# Patient Record
Sex: FEMALE | Race: WHITE | NOT HISPANIC OR LATINO | Employment: PART TIME | URBAN - METROPOLITAN AREA
[De-identification: names, ages, dates, MRNs, and addresses within clinical notes are randomized per-mention and may not be internally consistent; named-entity substitution may affect disease eponyms.]

---

## 2017-02-07 ENCOUNTER — OFFICE VISIT (OUTPATIENT)
Dept: DERMATOLOGY | Facility: CLINIC | Age: 32
End: 2017-02-07

## 2017-02-07 VITALS — DIASTOLIC BLOOD PRESSURE: 66 MMHG | HEART RATE: 71 BPM | SYSTOLIC BLOOD PRESSURE: 120 MMHG

## 2017-02-07 DIAGNOSIS — L63.9 AA (ALOPECIA AREATA): Primary | ICD-10-CM

## 2017-02-07 DIAGNOSIS — L91.0 HYPERTROPHIC SCAR: ICD-10-CM

## 2017-02-07 DIAGNOSIS — L21.9 DERMATITIS, SEBORRHEIC: ICD-10-CM

## 2017-02-07 DIAGNOSIS — M35.9 AUTOIMMUNE DISEASE (H): ICD-10-CM

## 2017-02-07 ASSESSMENT — PAIN SCALES - GENERAL: PAINLEVEL: NO PAIN (0)

## 2017-02-07 NOTE — NURSING NOTE
"Dermatology Rooming Note    Shea Siddiqui's goals for this visit include:   Chief Complaint   Patient presents with     Derm Problem     Patient comes to clinic today for hairloss. States \"I have some new growth but I'm still losing a lot.\"     Tisha Ernst, [facility]    "

## 2017-02-07 NOTE — MR AVS SNAPSHOT
After Visit Summary   2/7/2017    Shea Siddiqui    MRN: [id number]           Patient Information     Date Of Birth          1985        Visit Information        Provider Department      2/7/2017 2:05 PM Valery Santoyo MD [facility] Dermatology        Care Instructions    Today:    1. We will perform repeat injections- total of 4 cc  2. We ask that you discuss a possible prednisone taper with your Gyn/fertility specialist before undertaking treatment (prednisone can suppress sex hormones, and we wouldn't want to mess up any testing!)  3. We will inject the same triamcinolone medication into the scar on the heel    No link between Clomid and alopecia areata that we know of.        Follow-ups after your visit        Who to contact     Please call your clinic at [phone] to:    Ask questions about your health    Make or cancel appointments    Discuss your medicines    Learn about your test results    Speak to your doctor   If you have compliments or concerns about an experience at your clinic, or if you wish to file a complaint, please contact [facility] Patient Relations at [phone] or email us at [email or web address]         Additional Information About Your Visit        MyChart Information     [employer] gives you secure access to your electronic health record. If you see a primary care provider, you can also send messages to your care team and make appointments. If you have questions, please call your primary care clinic.  If you do not have a primary care provider, please call [phone] and they will assist you.      [employer]let is an electronic gateway that provides easy, online access to your medical records. With [employer], you can request a clinic appointment, read your test results, renew a prescription or communicate with your care team.     To access your existing account, please contact your [facility] Clinic  or call [phone] for assistance.        Care EveryWhere ID     This is your Care EveryWhere ID. This could be used by other organizations to access your [facility] medical records  [id number]        Your Vitals Were     Pulse                   71            Blood Pressure from Last 3 Encounters:   02/07/17 120/66   12/12/16 127/75   06/27/16 122/72    Weight from Last 3 Encounters:   12/29/15 106.595 kg (235 lb)              Today, you had the following     No orders found for display       Primary Care Provider    None       No address on file        Thank you!     Thank you for choosing [facility] DERMATOLOGY  for your care. Our goal is always to provide you with excellent care. Hearing back from our patients is one way we can continue to improve our services. Please take a few minutes to complete the written survey that you may receive in the mail after your visit with us. Thank you!             Your Updated Medication List - Protect others around you: Learn how to safely use, store and throw away your medicines at www.disposemymeds.org.          This list is accurate as of: 2/7/17  3:02 PM.  Always use your most recent med list.                   Brand Name Dispense Instructions for use    ALBUTEROL IN      Inhale into the lungs as needed       clobetasol propionate 0.05 % Foam    OLUX    100 g    Apply a golf ball size of product to affected areas nightly 6 to 7 days of the week (one day off)       esomeprazole 20 MG CR capsule    nexIUM    60 capsule    Take 1 capsule (20 mg) by mouth every morning (before breakfast) Take 30-60 minutes before eating.       flurandrenolide 4 MCG/SQCM Tape    CORDRAN    1 each    Apply to affected area on left achilles daily until healed.       predniSONE 10 MG tablet    DELTASONE    112 tablet    Take 1 tablet (10 mg) by mouth every 48 hours

## 2017-02-07 NOTE — PROGRESS NOTES
[facility] Dermatology Note    Dermatology Problem List:  1. Alopecia areata with concomitant seb derm (improved 2/7/17)  -s/p 3 cc ILK 10mg/cc at 12/12/16 visit, 4 cc ILK 10mg/cc  -current treatment: clobetasol foam 0.05% nightly, and Head & Shoulders shampoo daily   2. Lichen simplex chronicus vs hypertrophic scar - left achilles  -Cordran tape too expensive, ILK-10 0.1cc 2/7/17    Encounter Date: Feb 7, 2017    CC:  No chief complaint on file.    History of Present Illness:  Ms. Shea Siddiqui is a 31 year old female who presents as a follow-up for alopecia areata. The patient was last seen 12/12/16 when a total of  3 total cc of Kenalog 10 mg/cc was injected into approximately 30 sites on the scalp and it was recommended she continue clobetasol 0.05% foam nightly and start Head and Shoulders shampoo.    Today, she notes some regrowth on the left side of the scalp, but has additionally noticed more loss of hair diffusely, perhaps more on the right side. She is using clobetasol 0.05% foam a couple times per week.  She is using the H&S Shampoo every time she shampoos. She washes her hair after with Biolage shampoo and conditioner. She is hoping for repeat injections today as she is hoping to get pregnant soon at which time she would stop injections.     She denies pruritus, burning or other scalp pain. She did have L knee surgery December 29th and it went smoothly.Denies any changes in medication or significant health changes since her last visit.     She did not purchase the Cordran tape due to it being too expensive.     Past Medical History:   Patient Active Problem List   Diagnosis     Alopecia areata     Autoimmune disease (H)     KP (keratosis pilaris)     Dermatitis, seborrheic     Skin atrophy     No past medical history on file.  No past surgical history on file.    Social History:  The patient works as a .  Patient and her  have 2 children, and are trying to get  pregnant with a third child.      Medications:  Current Outpatient Prescriptions   Medication Sig Dispense Refill     flurandrenolide (CORDRAN) 4 MCG/SQCM TAPE Apply to affected area on left achilles daily until healed. 1 each 3     clobetasol propionate (OLUX) 0.05 % FOAM Apply a golf ball size of product to affected areas nightly 6 to 7 days of the week (one day off) 100 g 3     predniSONE (DELTASONE) 10 MG tablet Take 1 tablet (10 mg) by mouth every 48 hours 112 tablet 0     esomeprazole (NEXIUM) 20 MG capsule Take 1 capsule (20 mg) by mouth every morning (before breakfast) Take 30-60 minutes before eating. 60 capsule 3     ALBUTEROL IN Inhale into the lungs as needed       Allergies   Allergen Reactions     Ceclor [Cefaclor]      hives     Erythromycin      hives     Relafen [Nabumetone]      hives     Rocephin [Ceftriaxone]      hives     Review of Systems:  - A six point ROS was negative except as noted in HPI.     Physical exam:  GEN: This is a well-developed, well-nourished female in no acute distress, in a pleasant mood.   SKIN: Focused examination of the face, scalp, fingernails, and left lower extremity was performed and notable for:  -Marie skin type II  -scalp free of erythema or scale  -large patches of alopecia present in ophiasis pattern with patches of regrowth at previous injection sites and scattered areas of mild scalp atrophy   -multiple patches of alopecia on frontal/temporal scalp with mild atrophy present (wil over L frontal scalp)  -mild nail pitting present on bilateral fingernails  -no patches of hair loss present on eyebrows or eyelashes  -left achilles with scaly and sclerotic pink linear plaque with central vertical shallow erosion with hemorrhagic crust.   -No other lesions of concern on areas examined.   -Negative hair pull tests    Impression/Plan:  1. Alopecia areata, previously with concomitant seborrheic dermatitis (improved 2/7/17) and mild skin atrophy related to ILK  injections: Worsening alopecia areata since having IUD removed in August. Skin atrophy also noted which is most likely related to previous ILK injections.    Kenalog intralesional injection procedure note: After verbal consent and discussion of risks including but not limited to atrophy, pain, and bruising, 3.9 total cc of Kenalog 10 mg/cc was injected into approximately 39 sites on the scalp.  The patient tolerated the procedure well and left the Dermatology clinic in good condition. Sites of skin atrophy were avoided.    Continue clobetasol 0.05% foam 3x per week    Continue Head and Shoulders shampoo daily to prevent seb derm    Photographs taken for future reference    Discussed that prednisone taper would impact sex hormone balance and may confound the fertility discussion with her OB/Gyn ( Glucocorticoids affect gonadal function at multiple levels in hypothalamo-pituitary-gonadal axis: 1) the hypothalamus (to decrease the synthesis and release of GnRH); 2) the pituitary gland (to inhibit the synthesis and release of LH and FSH); 3) the testis/ovary (to modulate steroidogenesis and/or gametogenesis directly).    2.   Lichen simplex chronicus vs hypertrophic scar - left achilles. Previous traumatic injury. Difficult area to heal given rubbing on boots.    Cordran tape too expensive    Kenalog intralesional injection procedure note: After verbal consent and discussion of risks including but not limited to atrophy, pain, and bruising, 0.1 total cc of Kenalog 10 mg/cc was injected into L Achilles scar.  The patient tolerated the procedure well and left the Dermatology clinic in good condition.     Follow-up in 2 months, earlier for new or changing lesions.     Staff Involved:  Patient was seen and discussed with Dr. Santoyo.    Lisa Khalil MD  PGY-3 Dermatology  Pager: [phone]      Patient was seen and examined with the dermatology resident. I agree with the history, review of systems, physical  examination, assessments and plan. ILK injections were completed together.     Valery Santoyo MD  Professor and  Chair  Department of Dermatology  [facility]

## 2017-02-07 NOTE — PATIENT INSTRUCTIONS
Today:    1. We will perform repeat injections- total of 4 cc  2. We ask that you discuss a possible prednisone taper with your Gyn/fertility specialist before undertaking treatment (prednisone can suppress sex hormones, and we wouldn't want to mess up any testing!)  3. We will inject the same triamcinolone medication into the scar on the heel    No link between Clomid and alopecia areata that we know of.

## 2017-02-07 NOTE — LETTER
2/7/2017     RE: Shea Siddiqui  [address] [place] [address]     Dear Colleague,    Thank you for referring your patient, Shea Siddiqui, to the [facility] DERMATOLOGY at [facility]. Please see a copy of my visit note below.    [facility] Dermatology Note    Dermatology Problem List:  1. Alopecia areata with concomitant seb derm (improved 2/7/17)  -s/p 3 cc ILK 10mg/cc at 12/12/16 visit, 4 cc ILK 10mg/cc  -current treatment: clobetasol foam 0.05% nightly, and Head & Shoulders shampoo daily   2. Lichen simplex chronicus vs hypertrophic scar - left achilles  -Cordran tape too expensive, ILK-10 0.1cc 2/7/17    Encounter Date: Feb 7, 2017    CC:  No chief complaint on file.    History of Present Illness:  Ms. Shea Siddiqui is a 31 year old female who presents as a follow-up for alopecia areata. The patient was last seen 12/12/16 when a total of  3 total cc of Kenalog 10 mg/cc was injected into approximately 30 sites on the scalp and it was recommended she continue clobetasol 0.05% foam nightly and start Head and Shoulders shampoo.    Today, she notes some regrowth on the left side of the scalp, but has additionally noticed more loss of hair diffusely, perhaps more on the right side. She is using clobetasol 0.05% foam a couple times per week.  She is using the H&S Shampoo every time she shampoos. She washes her hair after with Biolage shampoo and conditioner. She is hoping for repeat injections today as she is hoping to get pregnant soon at which time she would stop injections.     She denies pruritus, burning or other scalp pain. She did have L knee surgery December 29th and it went smoothly.Denies any changes in medication or significant health changes since her last visit.     She did not purchase the Cordran tape due to it being too expensive.     Past Medical History:   Patient Active Problem List   Diagnosis     Alopecia areata     Autoimmune  disease (H)     KP (keratosis pilaris)     Dermatitis, seborrheic     Skin atrophy     No past medical history on file.  No past surgical history on file.    Social History:  The patient works as a .  Patient and her  have 2 children, and are trying to get pregnant with a third child.      Medications:  Current Outpatient Prescriptions   Medication Sig Dispense Refill     flurandrenolide (CORDRAN) 4 MCG/SQCM TAPE Apply to affected area on left achilles daily until healed. 1 each 3     clobetasol propionate (OLUX) 0.05 % FOAM Apply a golf ball size of product to affected areas nightly 6 to 7 days of the week (one day off) 100 g 3     predniSONE (DELTASONE) 10 MG tablet Take 1 tablet (10 mg) by mouth every 48 hours 112 tablet 0     esomeprazole (NEXIUM) 20 MG capsule Take 1 capsule (20 mg) by mouth every morning (before breakfast) Take 30-60 minutes before eating. 60 capsule 3     ALBUTEROL IN Inhale into the lungs as needed       Allergies   Allergen Reactions     Ceclor [Cefaclor]      hives     Erythromycin      hives     Relafen [Nabumetone]      hives     Rocephin [Ceftriaxone]      hives     Review of Systems:  - A six point ROS was negative except as noted in HPI.     Physical exam:  GEN: This is a well-developed, well-nourished female in no acute distress, in a pleasant mood.   SKIN: Focused examination of the face, scalp, fingernails, and left lower extremity was performed and notable for:  -Marie skin type II  -scalp free of erythema or scale  -large patches of alopecia present in ophiasis pattern with patches of regrowth at previous injection sites and scattered areas of mild scalp atrophy   -multiple patches of alopecia on frontal/temporal scalp with mild atrophy present (wil over L frontal scalp)  -mild nail pitting present on bilateral fingernails  -no patches of hair loss present on eyebrows or eyelashes  -left achilles with scaly and sclerotic pink linear plaque with central  vertical shallow erosion with hemorrhagic crust.   -No other lesions of concern on areas examined.   -Negative hair pull tests    Impression/Plan:  1. Alopecia areata, previously with concomitant seborrheic dermatitis (improved 2/7/17) and mild skin atrophy related to ILK injections: Worsening alopecia areata since having IUD removed in August. Skin atrophy also noted which is most likely related to previous ILK injections.    Kenalog intralesional injection procedure note: After verbal consent and discussion of risks including but not limited to atrophy, pain, and bruising, 3.9 total cc of Kenalog 10 mg/cc was injected into approximately 39 sites on the scalp.  The patient tolerated the procedure well and left the Dermatology clinic in good condition. Sites of skin atrophy were avoided.    Continue clobetasol 0.05% foam 3x per week    Continue Head and Shoulders shampoo daily to prevent seb derm    Photographs taken for future reference    Discussed that prednisone taper would impact sex hormone balance and may confound the fertility discussion with her OB/Gyn ( Glucocorticoids affect gonadal function at multiple levels in hypothalamo-pituitary-gonadal axis: 1) the hypothalamus (to decrease the synthesis and release of GnRH); 2) the pituitary gland (to inhibit the synthesis and release of LH and FSH); 3) the testis/ovary (to modulate steroidogenesis and/or gametogenesis directly).    2.   Lichen simplex chronicus vs hypertrophic scar - left achilles. Previous traumatic injury. Difficult area to heal given rubbing on boots.    Cordran tape too expensive    Kenalog intralesional injection procedure note: After verbal consent and discussion of risks including but not limited to atrophy, pain, and bruising, 0.1 total cc of Kenalog 10 mg/cc was injected into L Achilles scar.  The patient tolerated the procedure well and left the Dermatology clinic in good condition.     Follow-up in 2 months, earlier for new or changing  lesions.     Staff Involved:  Patient was seen and discussed with Dr. Santoyo.    Lisa Khalil MD  PGY-3 Dermatology  Pager: [phone]      Patient was seen and examined with the dermatology resident. I agree with the history, review of systems, physical examination, assessments and plan. ILK injections were completed together.     Valery Santoyo MD  Professor and  Chair  Department of Dermatology  [facility]                        Pictures taken of patient today to be placed in chart for future reference.    Again, thank you for allowing me to participate in the care of your patient.      Sincerely,    Valery Santoyo MD

## 2017-06-12 ENCOUNTER — OFFICE VISIT (OUTPATIENT)
Dept: DERMATOLOGY | Facility: CLINIC | Age: 32
End: 2017-06-12

## 2017-06-12 DIAGNOSIS — M35.9 AUTOIMMUNE DISEASE (H): ICD-10-CM

## 2017-06-12 DIAGNOSIS — L63.9 ALOPECIA AREATA: Primary | ICD-10-CM

## 2017-06-12 DIAGNOSIS — L21.9 DERMATITIS, SEBORRHEIC: ICD-10-CM

## 2017-06-12 RX ORDER — PROMETHAZINE HYDROCHLORIDE 25 MG/1
TABLET ORAL EVERY 6 HOURS PRN
COMMUNITY

## 2017-06-12 ASSESSMENT — PAIN SCALES - GENERAL: PAINLEVEL: NO PAIN (0)

## 2017-06-12 NOTE — NURSING NOTE
Drug Administration Record    Drug Name: triamcinolone acetonide(kenalog)  Dose: 1mL of triamcinolone 10mg/mL, 10mg dose  Route administered: ID  NDC #: Kenalog-10 ([address]-72)  Amount of waste(mL):4  Reason for waste: Single use vial

## 2017-06-12 NOTE — PROGRESS NOTES
"[facility] Dermatology Note      Dermatology Problem List:  1.Alopecia areata   -Patient is 16 weeks pregnant  -1 cc of 10/mg kenalog injection today     2. Lichen simplex chronicus vs hypertrophic scar - left achilles. - Not addressed at this visit.      Encounter Date: Jun 12, 2017    CC:  Chief Complaint   Patient presents with     Derm Problem     Shea is here today for an alopecia areata follow up.  States that it's \"worse\" since her last visit.  States she is also \"16 weeks pregnant.\"           History of Present Illness:  Ms. Shea Siddiqui is a 32 year old female who is 16 weeks pregnant at the end of this week. and presents today for follow-up alopecia areata. She was last seen 2/7/2017 at which time she had 3.9 total cc of kenalog 10 mg/cc injections which she tolerated well.    Since last visit feels hair loss is worse, however she feels this is due to her pregnancy. Previously used clobetasol 0.05% foam 3x a week. She currently is not using any medication on her scalp due to the pregnancy. Using head and shoulders shampoo and Biolage shampoo. Hair loss predominantly on temporal area and noticing more on her part. No burning, pain, erythema, pruritis. Sometimes styles her hair.     Currently taking prenatal supplement which has iron in it. Thyroid labs were normal. Has not had a history of gestational diabetes. She states her Obstetrician is okay with her receiving IL  kenalog injections today.     Past Medical History:   Patient Active Problem List   Diagnosis     Alopecia areata     Autoimmune disease (H)     KP (keratosis pilaris)     Dermatitis, seborrheic     Skin atrophy     History reviewed. No pertinent past medical history.  History reviewed. No pertinent surgical history.    Social History:  The patient works as a .    Medications:  Current Outpatient Prescriptions   Medication Sig Dispense Refill     Ondansetron HCl (ZOFRAN PO) Take by mouth as needed for " nausea or vomiting       promethazine (PHENERGAN) 25 MG tablet Take by mouth every 6 hours as needed for nausea       ranitidine (ZANTAC) 150 MG capsule Take by mouth 2 times daily       Prenatal MV-Min-Fe Fum-FA-DHA (PRENATAL 1 PO) Take by mouth daily       ALBUTEROL IN Inhale into the lungs as needed       clobetasol propionate (OLUX) 0.05 % FOAM Apply a golf ball size of product to affected areas nightly 6 to 7 days of the week (one day off) (Patient not taking: Reported on 6/12/2017) 100 g 3     predniSONE (DELTASONE) 10 MG tablet Take 1 tablet (10 mg) by mouth every 48 hours (Patient not taking: Reported on 6/12/2017) 112 tablet 0     esomeprazole (NEXIUM) 20 MG capsule Take 1 capsule (20 mg) by mouth every morning (before breakfast) Take 30-60 minutes before eating. (Patient not taking: Reported on 6/12/2017) 60 capsule 3     Allergies   Allergen Reactions     Ceclor [Cefaclor]      hives     Erythromycin      hives     Relafen [Nabumetone]      hives     Rocephin [Ceftriaxone]      hives         Review of Systems:  -Constitutional: The patient denies fatigue, fevers, chills, unintended weight loss, and night sweats.   -First trimester had a lot of nausea and vomiting   -Skin: As above in HPI. No additional skin concerns.    Physical exam:  Vitals: /71 (BP Location: Left arm, Patient Position: Chair, Cuff Size: Adult Regular)  Pulse 64  GEN: This is a well developed, well-nourished female in no acute distress, in a pleasant mood.    SKIN: Focused examination of the scalp was performed.  -Negative hair pull test today; 1 hair fiber on posterior frontal scalp on hair pull test   -No other lesions of concern on areas examined.   - Focal patches of non-scarring alopecia appreciated on the scalp - see photodocumentation of these areas  -Eyebrows and eyelashes appear intact  - Minimal fingernail pitting  - Did not examine the lefts achilles heel area - will address at next visit  - No other areas of concern  on areas examined      Impression/Plan:      Alopecia areata, previously with concomitant seborrheic dermatitis (improved 2/7/17) and mild skin atrophy related to ILK injections: Worsening alopecia areata since start of recent pregnancy. OB has approved ILK injections at this visit.     Kenalog intralesional injection procedure note: After verbal consent and discussion of risks including but not limited to atrophy, pain, and bruising, 1.0 total cc of Kenalog 10 mg/cc was injected into approximately 10 sites on the scalp.  The patient tolerated the procedure well and left the Dermatology clinic in good condition. Sites of skin atrophy were avoided.    Continue Head and Shoulders shampoo daily to prevent seb derm    Photographs taken for future reference       2.                    Lichen simplex chronicus vs hypertrophic scar - left achilles. Not addressed at this visit.     CC Dr. Byrnes at [facility] in [place] on close of this encounter. Phone: [phone]  Follow-up 6-8 weeks as needed during pregancy.    Staff Involved:  Scribed by Dorothy Khanna MS3  for Dr. Valery Santoyo MD    I agree with the PFSH and ROS as completed by the Medical Student. The remainder of the encounter was performed by me and scribed by the Medical Student. The scribed note accurately reflects my personal services and the medical decisions made by me. ILK injections were done together.       Valery Santoyo MD  Professor and Chair  Department of Dermatology  [facility]

## 2017-06-12 NOTE — NURSING NOTE
"Dermatology Rooming Note    Shea Artur's goals for this visit include:   Chief Complaint   Patient presents with     Derm Problem     Shea is here today for an alopecia areata follow up.  States that it's \"worse\" since her last visit.  States she is also \"16 weeks pregnant.\"         Danielle Schwartz LPN  "

## 2017-06-12 NOTE — LETTER
"6/12/2017        RE: Shea Siddiqui  [address] [place] [address]     Dear Colleague,    Thank you for referring your patient, Shea Siddiqui, to the [facility] DERMATOLOGY at [facility]. Please see a copy of my visit note below.    [facility] Dermatology Note      Dermatology Problem List:  1.Alopecia areata   -Patient is 16 weeks pregnant  -1 cc of 10/mg kenalog injection today     2. Lichen simplex chronicus vs hypertrophic scar - left achilles. - Not addressed at this visit.      Encounter Date: Jun 12, 2017    CC:  Chief Complaint   Patient presents with     Derm Problem     Shea is here today for an alopecia areata follow up.  States that it's \"worse\" since her last visit.  States she is also \"16 weeks pregnant.\"           History of Present Illness:  Ms. Shea Siddiqui is a 32 year old female who is 16 weeks pregnant at the end of this week. and presents today for follow-up alopecia areata. She was last seen 2/7/2017 at which time she had 3.9 total cc of kenalog 10 mg/cc injections which she tolerated well.    Since last visit feels hair loss is worse, however she feels this is due to her pregnancy. Previously used clobetasol 0.05% foam 3x a week. She currently is not using any medication on her scalp due to the pregnancy. Using head and shoulders shampoo and Biolage shampoo. Hair loss predominantly on temporal area and noticing more on her part. No burning, pain, erythema, pruritis. Sometimes styles her hair.     Currently taking prenatal supplement which has iron in it. Thyroid labs were normal. Has not had a history of gestational diabetes. She states her Obstetrician is okay with her receiving [place]  kenalog injections today.     Past Medical History:   Patient Active Problem List   Diagnosis     Alopecia areata     Autoimmune disease (H)     KP (keratosis pilaris)     Dermatitis, seborrheic     Skin atrophy     History reviewed. No " pertinent past medical history.  History reviewed. No pertinent surgical history.    Social History:  The patient works as a .    Medications:  Current Outpatient Prescriptions   Medication Sig Dispense Refill     Ondansetron HCl (ZOFRAN PO) Take by mouth as needed for nausea or vomiting       promethazine (PHENERGAN) 25 MG tablet Take by mouth every 6 hours as needed for nausea       ranitidine (ZANTAC) 150 MG capsule Take by mouth 2 times daily       Prenatal MV-Min-Fe Fum-FA-DHA (PRENATAL 1 PO) Take by mouth daily       ALBUTEROL IN Inhale into the lungs as needed       clobetasol propionate (OLUX) 0.05 % FOAM Apply a golf ball size of product to affected areas nightly 6 to 7 days of the week (one day off) (Patient not taking: Reported on 6/12/2017) 100 g 3     predniSONE (DELTASONE) 10 MG tablet Take 1 tablet (10 mg) by mouth every 48 hours (Patient not taking: Reported on 6/12/2017) 112 tablet 0     esomeprazole (NEXIUM) 20 MG capsule Take 1 capsule (20 mg) by mouth every morning (before breakfast) Take 30-60 minutes before eating. (Patient not taking: Reported on 6/12/2017) 60 capsule 3     Allergies   Allergen Reactions     Ceclor [Cefaclor]      hives     Erythromycin      hives     Relafen [Nabumetone]      hives     Rocephin [Ceftriaxone]      hives         Review of Systems:  -Constitutional: The patient denies fatigue, fevers, chills, unintended weight loss, and night sweats.   -First trimester had a lot of nausea and vomiting   -Skin: As above in HPI. No additional skin concerns.    Physical exam:  Vitals: /71 (BP Location: Left arm, Patient Position: Chair, Cuff Size: Adult Regular)  Pulse 64  GEN: This is a well developed, well-nourished female in no acute distress, in a pleasant mood.    SKIN: Focused examination of the scalp was performed.  -Negative hair pull test today; 1 hair fiber on posterior frontal scalp on hair pull test   -No other lesions of concern on areas examined.    - Focal patches of non-scarring alopecia appreciated on the scalp - see photodocumentation of these areas  -Eyebrows and eyelashes appear intact  - Minimal fingernail pitting  - Did not examine the lefts achilles heel area - will address at next visit  - No other areas of concern on areas examined      Impression/Plan:      Alopecia areata, previously with concomitant seborrheic dermatitis (improved 2/7/17) and mild skin atrophy related to ILK injections: Worsening alopecia areata since start of recent pregnancy. OB has approved ILK injections at this visit.     Kenalog intralesional injection procedure note: After verbal consent and discussion of risks including but not limited to atrophy, pain, and bruising, 1.0 total cc of Kenalog 10 mg/cc was injected into approximately 10 sites on the scalp.  The patient tolerated the procedure well and left the Dermatology clinic in good condition. Sites of skin atrophy were avoided.    Continue Head and Shoulders shampoo daily to prevent seb derm    Photographs taken for future reference       2.                    Lichen simplex chronicus vs hypertrophic scar - left achilles. Not addressed at this visit.     CC Dr. Byrnes at [facility] in [place] on close of this encounter. Phone: [phone]  Follow-up 6-8 weeks as needed during pregancy.    Staff Involved:  Scribed by Dorothy Khanna MS3  for Dr. Valery Santoyo MD    I agree with the PFSH and ROS as completed by the Medical Student. The remainder of the encounter was performed by me and scribed by the Medical Student. The scribed note accurately reflects my personal services and the medical decisions made by me. ILK injections were done together.       Valery Santoyo MD  Professor and Chair  Department of Dermatology  [facility]                            Pictures were placed in Pt's chart today for future reference.          Again, thank you for allowing me to participate in the care of your  patient.      Sincerely,    Valery Santoyo MD

## 2017-06-12 NOTE — MR AVS SNAPSHOT
After Visit Summary   6/12/2017    Shea Siddiqui    MRN: [id number]           Patient Information     Date Of Birth          1985        Visit Information        Provider Department      6/12/2017 9:00 AM Valery Santoyo MD [facility] Dermatology         Follow-ups after your visit        Your next 10 appointments already scheduled     Sep 18, 2017 10:15 AM CDT   (Arrive by 10:00 AM)   RETURN HAIRLOSS with Valery Santoyo MD   [facility] Dermatology ([facility] [place])    [address] [place] [id number] [phone]              Who to contact     Please call your clinic at [phone] to:    Ask questions about your health    Make or cancel appointments    Discuss your medicines    Learn about your test results    Speak to your doctor   If you have compliments or concerns about an experience at your clinic, or if you wish to file a complaint, please contact [facility] Physicians Patient Relations at [phone] or email us at [email or web address]         Additional Information About Your Visit        MyChart Information     [employer] gives you secure access to your electronic health record. If you see a primary care provider, you can also send messages to your care team and make appointments. If you have questions, please call your primary care clinic.  If you do not have a primary care provider, please call [phone] and they will assist you.      [employer] is an electronic gateway that provides easy, online access to your medical records. With [employer], you can request a clinic appointment, read your test results, renew a prescription or communicate with your care team.     To access your existing account, please contact your [facility] Phys[facility] Clinic or call [phone] for assistance.        Care EveryWhere ID     This is your Care EveryWhere ID. This could be used by other  organizations to access your [place] medical records  [id number]        Your Vitals Were     Pulse                   64            Blood Pressure from Last 3 Encounters:   06/12/17 112/71   02/07/17 120/66   12/12/16 127/75    Weight from Last 3 Encounters:   12/29/15 106.6 kg (235 lb)              Today, you had the following     No orders found for display         Today's Medication Changes          These changes are accurate as of: 6/12/17  9:33 AM.  If you have any questions, ask your nurse or doctor.               Stop taking these medicines if you haven't already. Please contact your care team if you have questions.     flurandrenolide 4 MCG/SQCM Tape   Commonly known as:  CORDRAN   Stopped by:  Valery Santoyo MD                    Primary Care Provider    None       No address on file        Thank you!     Thank you for choosing [facility] DERMATOLOGY  for your care. Our goal is always to provide you with excellent care. Hearing back from our patients is one way we can continue to improve our services. Please take a few minutes to complete the written survey that you may receive in the mail after your visit with us. Thank you!             Your Updated Medication List - Protect others around you: Learn how to safely use, store and throw away your medicines at www.disposemymeds.org.          This list is accurate as of: 6/12/17  9:33 AM.  Always use your most recent med list.                   Brand Name Dispense Instructions for use    ALBUTEROL IN      Inhale into the lungs as needed       clobetasol propionate 0.05 % Foam    OLUX    100 g    Apply a golf ball size of product to affected areas nightly 6 to 7 days of the week (one day off)       esomeprazole 20 MG CR capsule    nexIUM    60 capsule    Take 1 capsule (20 mg) by mouth every morning (before breakfast) Take 30-60 minutes before eating.       predniSONE 10 MG tablet    DELTASONE    112 tablet    Take 1 tablet (10 mg) by mouth every  48 hours       PRENATAL 1 PO      Take by mouth daily       promethazine 25 MG tablet    PHENERGAN     Take by mouth every 6 hours as needed for nausea       ranitidine 150 MG capsule    ZANTAC     Take by mouth 2 times daily       ZOFRAN PO      Take by mouth as needed for nausea or vomiting

## 2017-06-14 VITALS — HEART RATE: 64 BPM | SYSTOLIC BLOOD PRESSURE: 112 MMHG | DIASTOLIC BLOOD PRESSURE: 71 MMHG

## 2017-06-14 ASSESSMENT — PAIN SCALES - GENERAL: PAINLEVEL: NO PAIN (0)

## 2017-09-18 ENCOUNTER — OFFICE VISIT (OUTPATIENT)
Dept: DERMATOLOGY | Facility: CLINIC | Age: 32
End: 2017-09-18

## 2017-09-18 VITALS — DIASTOLIC BLOOD PRESSURE: 62 MMHG | SYSTOLIC BLOOD PRESSURE: 116 MMHG | HEART RATE: 76 BPM

## 2017-09-18 DIAGNOSIS — L63.9 ALOPECIA AREATA: ICD-10-CM

## 2017-09-18 DIAGNOSIS — M35.9 AUTOIMMUNE DISEASE (H): ICD-10-CM

## 2017-09-18 DIAGNOSIS — L23.0 ALLERGIC CONTACT DERMATITIS DUE TO METALS: Primary | ICD-10-CM

## 2017-09-18 RX ORDER — TRIAMCINOLONE ACETONIDE 1 MG/G
OINTMENT TOPICAL
Qty: 30 G | Refills: 1 | Status: SHIPPED | OUTPATIENT
Start: 2017-09-18

## 2017-09-18 ASSESSMENT — PAIN SCALES - GENERAL: PAINLEVEL: NO PAIN (0)

## 2017-09-18 NOTE — PATIENT INSTRUCTIONS
For rash on the finger, try wearing the ring on the right hand to see if you develop a similar reaction.  For treatment of the rash, apply triamcinolone 0.1% ointment twice per day until resolved.   You may also consider patch testing in the future to identify a specific allergen.

## 2017-09-18 NOTE — MR AVS SNAPSHOT
After Visit Summary   9/18/2017    Shea Siddiqui    MRN: [id number]           Patient Information     Date Of Birth          1985        Visit Information        Provider Department      9/18/2017 10:15 AM Valery Santoyo MD [facility] Dermatology        Today's Diagnoses     Allergic contact dermatitis due to metals    -  1      Care Instructions    For rash on the finger, try wearing the ring on the right hand to see if you develop a similar reaction.  For treatment of the rash, apply triamcinolone 0.1% ointment twice per day until resolved.   You may also consider patch testing in the future to identify a specific allergen.          Follow-ups after your visit        Follow-up notes from your care team     Return in about 3 months (around 12/18/2017) for Routine Visit.      Your next 10 appointments already scheduled     Dec 18, 2017  2:40 PM CST   (Arrive by 2:25 PM)   RETURN HAIRLOSS with Valery Santoyo MD   [facility] Dermatology ([facility] [place])    [address] [place] [address] [phone]              Who to contact     Please call your clinic at [phone] to:    Ask questions about your health    Make or cancel appointments    Discuss your medicines    Learn about your test results    Speak to your doctor   If you have compliments or concerns about an experience at your clinic, or if you wish to file a complaint, please contact [facility] Physicians Patient Relations at [phone] or email us at [email or web address]         Additional Information About Your Visit        MyChart Information     [employer] gives you secure access to your electronic health record. If you see a primary care provider, you can also send messages to your care team and make appointments. If you have questions, please call your primary care clinic.  If you do not have a primary care provider, please call  [phone] and they will assist you.      [employer] is an electronic gateway that provides easy, online access to your medical records. With [employer], you can request a clinic appointment, read your test results, renew a prescription or communicate with your care team.     To access your existing account, please contact your [facility] Phys[facility] Clinic or call [phone] for assistance.        Care EveryWhere ID     This is your Care EveryWhere ID. This could be used by other organizations to access your [place] medical records  [id number]        Your Vitals Were     Pulse                   76            Blood Pressure from Last 3 Encounters:   09/18/17 116/62   06/12/17 112/71   02/07/17 120/66    Weight from Last 3 Encounters:   12/29/15 106.6 kg (235 lb)              Today, you had the following     No orders found for display         Today's Medication Changes          These changes are accurate as of: 9/18/17 11:37 AM.  If you have any questions, ask your nurse or doctor.               Start taking these medicines.        Dose/Directions    triamcinolone 0.1 % ointment   Commonly known as:  KENALOG   Used for:  Allergic contact dermatitis due to metals   Started by:  Valery Santoyo MD        Apply to rash on the left ring finger twice per day until resolution   Quantity:  30 g   Refills:  1         Stop taking these medicines if you haven't already. Please contact your care team if you have questions.     esomeprazole 20 MG CR capsule   Commonly known as:  nexIUM   Stopped by:  Valery Santoyo MD                Where to get your medicines      These medications were sent to [id number] - [place] - [address] AT [address] & [address]  [address] [address]    Hours:  24-hours Phone:  [phone]     triamcinolone 0.1 % ointment                Primary Care Provider    None       No address on file         Equal Access to Services     [facility] CenterPATRICIA : Hadii aad ku hadsuzannearnol Villafana, waquynhda luqbarbraha, qashannan kaarlinedanuta ham. So [facility] [phone].    ATENCIÓN: Si salenala eric, tiene a guzman disposición servicios gratuitos de asistencia lingüística. Melviname al [phone].    We comply with applicable federal civil rights laws and [place] laws. We do not discriminate on the basis of race, color, national origin, age, disability sex, sexual orientation or gender identity.            Thank you!     Thank you for choosing [facility] DERMATOLOGY  for your care. Our goal is always to provide you with excellent care. Hearing back from our patients is one way we can continue to improve our services. Please take a few minutes to complete the written survey that you may receive in the mail after your visit with us. Thank you!             Your Updated Medication List - Protect others around you: Learn how to safely use, store and throw away your medicines at www.disposemymeds.org.          This list is accurate as of: 9/18/17 11:37 AM.  Always use your most recent med list.                   Brand Name Dispense Instructions for use Diagnosis    ALBUTEROL IN      Inhale into the lungs as needed        clobetasol propionate 0.05 % Foam    OLUX    100 g    Apply a golf ball size of product to affected areas nightly 6 to 7 days of the week (one day off)    Alopecia areata, Autoimmune disease (H)       predniSONE 10 MG tablet    DELTASONE    112 tablet    Take 1 tablet (10 mg) by mouth every 48 hours    Alopecia areata       PRENATAL 1 PO      Take by mouth daily        promethazine 25 MG tablet    PHENERGAN     Take by mouth every 6 hours as needed for nausea        ranitidine 150 MG capsule    ZANTAC     Take by mouth 2 times daily        triamcinolone 0.1 % ointment    KENALOG    30 g    Apply to rash on the left ring finger twice per day until resolution    Allergic contact dermatitis due  to metals       ZOFRAN PO      Take by mouth as needed for nausea or vomiting

## 2017-09-18 NOTE — PROGRESS NOTES
"[facility] Dermatology Note      Dermatology Problem List:  1. Alopecia areata:    -Patient is 30 weeks pregnant (as of 9/18/17)  -Has responded to ILK, last administered 6/2017     2. Lichen simplex chronicus vs hypertrophic scar - left achilles    3. Contact dermatitis, L ring finger: Suspect ACD 2/2 metal      Encounter Date: Sep 18, 2017    CC:  Chief Complaint   Patient presents with     Hair Loss     Shea comes to clinic today for Alopecia areata. States \"it's the same.\"         History of Present Illness:  Ms. Shea Siddiqui is a 32 year old female who is 30 weeks pregnant at the end of this week. and presents today for follow-up alopecia areata. She was last seen 6/12/2017 at which time she underwent ILK. Since then, she feels like she has had some regrowth in the treated areas though still substantial hair loss. The patient has not had any new areas of hair loss and no symptom associated with hair loss such as itching, tingling, burning, pain, etc. She is using primarily a Biolage shampoo, and periodically Head & Shoulders though she feels this makes her scalp more dry. The patient reports one other concern of an itchy rash on her left ring finger. This occurs in the area of her wedding ring and has been waxing/waning. The patient has used a topical steroid from a friend for 1-2 days in the past which helped significantly. She is otherwise feeling well with no additional skin concerns at this time.    Past Medical History:   Patient Active Problem List   Diagnosis     Alopecia areata     Autoimmune disease (H)     KP (keratosis pilaris)     Dermatitis, seborrheic     Skin atrophy     History reviewed. No pertinent past medical history.  History reviewed. No pertinent surgical history.    Social History:  The patient works as a .    Medications:  Current Outpatient Prescriptions   Medication Sig Dispense Refill     Ondansetron HCl (ZOFRAN PO) Take by mouth as needed for " nausea or vomiting       promethazine (PHENERGAN) 25 MG tablet Take by mouth every 6 hours as needed for nausea       ranitidine (ZANTAC) 150 MG capsule Take by mouth 2 times daily       Prenatal MV-Min-Fe Fum-FA-DHA (PRENATAL 1 PO) Take by mouth daily       clobetasol propionate (OLUX) 0.05 % FOAM Apply a golf ball size of product to affected areas nightly 6 to 7 days of the week (one day off) 100 g 3     predniSONE (DELTASONE) 10 MG tablet Take 1 tablet (10 mg) by mouth every 48 hours 112 tablet 0     ALBUTEROL IN Inhale into the lungs as needed       Allergies   Allergen Reactions     Ceclor [Cefaclor]      hives     Erythromycin      hives     Relafen [Nabumetone]      hives     Rocephin [Ceftriaxone]      hives         Review of Systems:  -Constitutional: The patient denies fatigue, fevers, chills, unintended weight loss, and night sweats.   -First trimester had a lot of nausea and vomiting   -Skin: As above in HPI. No additional skin concerns.    Physical exam:  Vitals: /62 (BP Location: Right arm, Patient Position: Sitting, Cuff Size: Adult Regular)  Pulse 76  GEN: This is a well developed, well-nourished female in no acute distress, in a pleasant mood.    SKIN: Focused examination of the scalp was performed and bilateral hands:  - Scalp with large patches of hair loss primarily on the bilateral parietal/temporal scalp, but also occipital scalp. Compared to prior, there is significant regrowth. Follicular ostia are noted in alopecic patches. Significant improvement compared to prior particularly with regrowth along the occipital scalp and frontal hairline.  - Negative hair pull test today  - Eyebrows and eyelashes appear intact  - Minimal fingernail pitting  - Left fourth finger with circumferential light pink and scaly patch corresponding to area of her wedding/engagement ring  - No other areas of concern on areas examined      Impression/Plan:  1. Alopecia areata: The patient has had improvement  during her pregnancy and with serially intralesional Kenalog. The patient has tolerated this well in the past and her OBGYN feels comfortable with ILK. However, given her progress she did not wish to have this repeated today. She will follow up in ~3 months to address her hair loss again, particularly after her delivery.     Continue Head and Shoulders shampoo daily to prevent seb derm    Photographs taken for future reference       2. Contact dermatitis, suspect allergic, left fourth finger: The nature of contact dermatitis, both irritant and allergic, were reviewed with the patient. The fact this is so focal in the area of her ring is suspicious for allergic contact dermatitis. She was advised to try wearing this on a different finger to see if she develops the same reaction. She may pursue patch testing in the future after delivery, but in the interim will treat with triamcinolone as below.     Apply triamcinolone 0.1% ointment BID until resolved    CC Dr. Byrnes at [facility] in [place] on close of this encounter. Phone: [phone]  Follow-up 3 months.    Staff Involved: MD Ottoniel Noel MD  Dermatology resident, PGY-4  [phone]     Patient was seen and examined with the dermatology resident. I agree with the history, review of systems, physical examination, assessments and plan. ILK injections were completed together.     Valery Santoyo MD  Professor and  Chair  Department of Dermatology  [facility] [place]  .

## 2017-09-18 NOTE — LETTER
"9/18/2017       RE: Shea Siddiqui  [address]  [place] [address]     Dear Colleague,    Thank you for referring your patient, Shea Siddiqui, to the [facility] DERMATOLOGY at [facility]. Please see a copy of my visit note below.    [facility] Dermatology Note      Dermatology Problem List:  1. Alopecia areata:    -Patient is 30 weeks pregnant (as of 9/18/17)  -Has responded to ILK, last administered 6/2017     2. Lichen simplex chronicus vs hypertrophic scar - left achilles    3. Contact dermatitis, L ring finger: Suspect ACD 2/2 metal      Encounter Date: Sep 18, 2017    CC:  Chief Complaint   Patient presents with     Hair Loss     Shea comes to clinic today for Alopecia areata. States \"it's the same.\"         History of Present Illness:  Ms. Shea Siddiqui is a 32 year old female who is 30 weeks pregnant at the end of this week. and presents today for follow-up alopecia areata. She was last seen 6/12/2017 at which time she underwent ILK. Since then, she feels like she has had some regrowth in the treated areas though still substantial hair loss. The patient has not had any new areas of hair loss and no symptom associated with hair loss such as itching, tingling, burning, pain, etc. She is using primarily a Biolage shampoo, and periodically Head & Shoulders though she feels this makes her scalp more dry. The patient reports one other concern of an itchy rash on her left ring finger. This occurs in the area of her wedding ring and has been waxing/waning. The patient has used a topical steroid from a friend for 1-2 days in the past which helped significantly. She is otherwise feeling well with no additional skin concerns at this time.    Past Medical History:   Patient Active Problem List   Diagnosis     Alopecia areata     Autoimmune disease (H)     KP (keratosis pilaris)     Dermatitis, seborrheic     Skin atrophy     History reviewed. No " pertinent past medical history.  History reviewed. No pertinent surgical history.    Social History:  The patient works as a .    Medications:  Current Outpatient Prescriptions   Medication Sig Dispense Refill     Ondansetron HCl (ZOFRAN PO) Take by mouth as needed for nausea or vomiting       promethazine (PHENERGAN) 25 MG tablet Take by mouth every 6 hours as needed for nausea       ranitidine (ZANTAC) 150 MG capsule Take by mouth 2 times daily       Prenatal MV-Min-Fe Fum-FA-DHA (PRENATAL 1 PO) Take by mouth daily       clobetasol propionate (OLUX) 0.05 % FOAM Apply a golf ball size of product to affected areas nightly 6 to 7 days of the week (one day off) 100 g 3     predniSONE (DELTASONE) 10 MG tablet Take 1 tablet (10 mg) by mouth every 48 hours 112 tablet 0     ALBUTEROL IN Inhale into the lungs as needed       Allergies   Allergen Reactions     Ceclor [Cefaclor]      hives     Erythromycin      hives     Relafen [Nabumetone]      hives     Rocephin [Ceftriaxone]      hives         Review of Systems:  -Constitutional: The patient denies fatigue, fevers, chills, unintended weight loss, and night sweats.   -First trimester had a lot of nausea and vomiting   -Skin: As above in HPI. No additional skin concerns.    Physical exam:  Vitals: /62 (BP Location: Right arm, Patient Position: Sitting, Cuff Size: Adult Regular)  Pulse 76  GEN: This is a well developed, well-nourished female in no acute distress, in a pleasant mood.    SKIN: Focused examination of the scalp was performed and bilateral hands:  - Scalp with large patches of hair loss primarily on the bilateral parietal/temporal scalp, but also occipital scalp. Compared to prior, there is significant regrowth. Follicular ostia are noted in alopecic patches. Significant improvement compared to prior particularly with regrowth along the occipital scalp and frontal hairline.  - Negative hair pull test today  - Eyebrows and eyelashes appear  intact  - Minimal fingernail pitting  - Left fourth finger with circumferential light pink and scaly patch corresponding to area of her wedding/engagement ring  - No other areas of concern on areas examined      Impression/Plan:  1. Alopecia areata: The patient has had improvement during her pregnancy and with serially intralesional Kenalog. The patient has tolerated this well in the past and her OBGYN feels comfortable with ILK. However, given her progress she did not wish to have this repeated today. She will follow up in ~3 months to address her hair loss again, particularly after her delivery.     Continue Head and Shoulders shampoo daily to prevent seb derm    Photographs taken for future reference       2. Contact dermatitis, suspect allergic, left fourth finger: The nature of contact dermatitis, both irritant and allergic, were reviewed with the patient. The fact this is so focal in the area of her ring is suspicious for allergic contact dermatitis. She was advised to try wearing this on a different finger to see if she develops the same reaction. She may pursue patch testing in the future after delivery, but in the interim will treat with triamcinolone as below.     Apply triamcinolone 0.1% ointment BID until resolved    CC Dr. Byrnes at [facility] in [place] on close of this encounter. Phone: [phone]  Follow-up 3 months.    Staff Involved: MD Ottoniel Noel MD  Dermatology resident, PGY-4  [phone]     Patient was seen and examined with the dermatology resident. I agree with the history, review of systems, physical examination, assessments and plan. ILK injections were completed together.     Valery Santoyo MD  Professor and  Chair  Department of Dermatology  [facility]  .                Pictures taken of patient today to be placed in chart for future reference.      Again, thank you for allowing me to participate in the care of your patient.       Sincerely,    Valery Santoyo MD

## 2017-12-17 ENCOUNTER — TELEPHONE (OUTPATIENT)
Dept: DERMATOLOGY | Facility: CLINIC | Age: 32
End: 2017-12-17

## 2017-12-17 NOTE — TELEPHONE ENCOUNTER
I had the opportunity to speak with Shea this afternoon. She delivered a baby girl about 2 weeks ago and both are doing well! She reports on-going hair growth but does have questions about whether she should have an IUD placed in about a month and how this could impact her alopecia areata. She also notes that with her previous pregnancies she usually began to experience hair loss at about the 5 month lety. The following recommendations were made:    1. If the IUD is the best birth control for her, she should proceed with placement.  2. We discussed having a medication available to halt new patches from spreading - she will check with the Pediatrician about use of topical steroids, particularly while breast feeding.  3. We reviewed the importance of maintaining a healthy scalp.  4. Follow-up at around the 5 month lety, post delivery - Shea will call to make an appointment in April.    Valery Santoyo MD

## 2017-12-31 ENCOUNTER — HEALTH MAINTENANCE LETTER (OUTPATIENT)
Age: 32
End: 2017-12-31

## 2018-07-09 ENCOUNTER — OFFICE VISIT (OUTPATIENT)
Dept: DERMATOLOGY | Facility: CLINIC | Age: 33
End: 2018-07-09
Payer: COMMERCIAL

## 2018-07-09 VITALS — HEART RATE: 60 BPM | DIASTOLIC BLOOD PRESSURE: 66 MMHG | SYSTOLIC BLOOD PRESSURE: 110 MMHG

## 2018-07-09 DIAGNOSIS — M35.9 AUTOIMMUNE DISEASE (H): ICD-10-CM

## 2018-07-09 DIAGNOSIS — L63.9 AA (ALOPECIA AREATA): Primary | ICD-10-CM

## 2018-07-09 DIAGNOSIS — L90.9 SKIN ATROPHY: ICD-10-CM

## 2018-07-09 DIAGNOSIS — L63.9 AA (ALOPECIA AREATA): ICD-10-CM

## 2018-07-09 LAB
DEPRECATED CALCIDIOL+CALCIFEROL SERPL-MC: 30 UG/L (ref 20–75)
FERRITIN SERPL-MCNC: 27 NG/ML (ref 12–150)
IRON SATN MFR SERPL: 22 % (ref 15–46)
IRON SERPL-MCNC: 69 UG/DL (ref 35–180)
THYROPEROXIDASE AB SERPL-ACNC: <10 IU/ML
TIBC SERPL-MCNC: 314 UG/DL (ref 240–430)
TSH SERPL DL<=0.005 MIU/L-ACNC: 1.76 MU/L (ref 0.4–4)

## 2018-07-09 RX ORDER — CLOBETASOL PROPIONATE 0.05 G/100ML
SHAMPOO TOPICAL 3 TIMES DAILY
COMMUNITY
End: 2019-06-29

## 2018-07-09 ASSESSMENT — PAIN SCALES - GENERAL: PAINLEVEL: NO PAIN (0)

## 2018-07-09 NOTE — PROGRESS NOTES
"[facility] Dermatology Note      Dermatology Problem List:  1. Alopecia areata:    -Has responded to ILK, last administered 6/21/2018 at outside facility      2. Lichen simplex chronicus vs hypertrophic scar - left achilles     3. Contact dermatitis, L ring finger: Suspect ACD 2/2 metal    Encounter Date: Jul 9, 2018    CC:  Chief Complaint   Patient presents with     Hair Loss     Alopecia areataShea states \"still losing hair, new spots are appearing and seeing new growth around the area where the medication was given\"         History of Present Illness:  Ms. Shea Siddiqui is a 33 year old female who presents as a follow-up for alopecia areata. The patient was last seen 9/18/17 when she was 30 weeks pregnant and hair loss had improved with pregnancy and serial ILK injections.  Since then she has given birth 7 months ago and had increased loss at the 4 months out from delivery lety. She has been going to a dermatologist closer to home as she was unable to get an appointment with our office and received ILK injections concentrated to the superior scalp which she notes has good regrowth currently. She does state she has a few new patches of hair loss on her posterior scalp and along her hairline.     Still using the clobetasol shampoo 3-4x weekly and alternates with Biolage shampoo. She does experience scalp \"tingling\" with the clobetasol. She has tried Head&Shoulders but feels this dries her hair out.     She is not currently taking any vitamins and is breast feeding. No contraception at this time- previously experienced increased hair loss with IUD.     Past Medical History:   Patient Active Problem List   Diagnosis     Alopecia areata     Autoimmune disease (H)     KP (keratosis pilaris)     Dermatitis, seborrheic     Skin atrophy     History reviewed. No pertinent past medical history.  History reviewed. No pertinent surgical history.    Social History:  No tobacco, currently breast " feeding    Family History:  Uncle has alopecia    Medications:  Current Outpatient Prescriptions   Medication Sig Dispense Refill     ALBUTEROL IN Inhale into the lungs as needed       clobetasol propionate 0.05 % SHAM Externally apply topically 3 times daily       ranitidine (ZANTAC) 150 MG capsule Take by mouth 2 times daily       clobetasol propionate (OLUX) 0.05 % FOAM Apply a golf ball size of product to affected areas nightly 6 to 7 days of the week (one day off) (Patient not taking: Reported on 7/9/2018) 100 g 3     Ondansetron HCl (ZOFRAN PO) Take by mouth as needed for nausea or vomiting       predniSONE (DELTASONE) 10 MG tablet Take 1 tablet (10 mg) by mouth every 48 hours (Patient not taking: Reported on 7/9/2018) 112 tablet 0     Prenatal MV-Min-Fe Fum-FA-DHA (PRENATAL 1 PO) Take by mouth daily       promethazine (PHENERGAN) 25 MG tablet Take by mouth every 6 hours as needed for nausea       triamcinolone (KENALOG) 0.1 % ointment Apply to rash on the left ring finger twice per day until resolution (Patient not taking: Reported on 7/9/2018) 30 g 1     Allergies   Allergen Reactions     Ceclor [Cefaclor]      hives     Erythromycin      hives     Relafen [Nabumetone]      hives     Rocephin [Ceftriaxone]      hives         Review of Systems:  -Constitutional: The patient denies fatigue, fevers, chills, unintended weight loss, and night sweats.  -HEENT: Patient denies nonhealing oral sores.  -Skin: As above in HPI. No additional skin concerns.    Physical exam:  Vitals: /66 (BP Location: Left arm, Patient Position: Chair)  Pulse 60  GEN: This is a well developed, well-nourished female in no acute distress, in a pleasant mood.    SKIN: Focused examination of the scalp, face and hands/lower arms was performed.  -6x8cm patch of hair loss superior to right ear  -2 2x2cm patches of hair loss to posterior scalp  -7x8cm patch of hair loss with 1cm regrowth to top of scalp, moderate perifollicular scale and  atrophy of epidermis  -1 2x2cm patch of hair loss to left sided hairline by forehead  -negative hair pull test  -eyebrows and eyelashes appear intact  -No other lesions of concern on areas examined.     Impression/Plan:  1. Alopecia areata    Has had last ILK injection 6/21/18 at outside facility, pediatrician is aware of this as mom is breast feeding. Will hold off from further ILK today with atrophy on exam today and good regrowth on superior scalp area that has been injected recently. Would consider injection of areas above ears in future visits.    Continue clobetasol shampoo 2-4x weekly     Will check TSH, Vit D, ferritin labs today to rule out other causes of hair loss post-partum      Follow-up in 3 months, earlier for new or changing lesions.     Staff Involved:  Scribed by Kelsie Ayala, MS4 for Dr. Santoyo.  Answers for HPI/ROS submitted by the patient on 7/9/2018   PHQ-2 Score: 0      I agree with the PFSH and ROS as completed by the Medical Student. The remainder of the encounter was performed by me and scribed by the Medical Student. The scribed note accurately reflects my personal services and the medical decisions made by me.      Valery Santoyo MD  Professor and Chair  Department of Dermatology  [facility]

## 2018-07-09 NOTE — NURSING NOTE
"Dermatology Rooming Note    Shea Siddiqui's goals for this visit include:   Chief Complaint   Patient presents with     Hair Loss     Alopecia areata, Shea states \"still losing hair, new spots are appearing and seeing new growth around the area where the medication was given\"     Kina Simental, CMA  "

## 2018-07-09 NOTE — MR AVS SNAPSHOT
After Visit Summary   7/9/2018    Shea Siddiqui    MRN: [id number]           Patient Information     Date Of Birth          1985        Visit Information        Provider Department      7/9/2018 10:10 AM Valery Santoyo MD [facility] Dermatology        Today's Diagnoses     AA (alopecia areata)    -  1    Autoimmune disease (H)          Care Instructions    Continue the clobetasol shampoo 2-4x weekly as you are doing already            Follow-ups after your visit        Your next 10 appointments already scheduled     Jul 09, 2018 11:45 AM CDT   LAB with  LAB   [facility] ([facility])    [address] [place] [id number] [phone]           Please do not eat 10-12 hours before your appointment if you are coming in fasting for labs on lipids, cholesterol, or glucose (sugar). This does not apply to pregnant women. Water, hot tea and black coffee (with nothing added) are okay. Do not drink other fluids, diet soda or chew gum.            Oct 15, 2018 10:30 AM CDT   (Arrive by 10:15 AM)   RETURN HAIRLOSS with Valery Santoyo MD   [facility] Dermatology ([facility])    [address] [place] [id number] [phone]              Future tests that were ordered for you today     Open Future Orders        Priority Expected Expires Ordered    Iron and iron binding capacity Routine  7/9/2019 7/9/2018    Ferritin Routine  7/9/2019 7/9/2018    Vitamin D Deficiency Routine  7/9/2019 7/9/2018    Thyroid peroxidase antibody Routine  7/9/2019 7/9/2018            Who to contact     Please call your clinic at [phone] to:    Ask questions about your health    Make or cancel appointments    Discuss your medicines    Learn about your test results    Speak to your doctor            Additional Information About Your Visit        RewardsPayhart Information     UpOut gives you secure access  to your electronic health record. If you see a primary care provider, you can also send messages to your care team and make appointments. If you have questions, please call your primary care clinic.  If you do not have a primary care provider, please call [phone] and they will assist you.      Playdemic is an electronic gateway that provides easy, online access to your medical records. With Playdem[employer], you can request a clinic appointment, read your test results, renew a prescription or communicate with your care team.     To access your existing account, please contact your [facility] or call [phone] for assistance.        Care EveryWhere ID     This is your Care EveryWhere ID. This could be used by other organizations to access your [facility] medical records  [id number]        Your Vitals Were     Pulse                   60            Blood Pressure from Last 3 Encounters:   07/09/18 110/66   09/18/17 116/62   06/12/17 112/71    Weight from Last 3 Encounters:   12/29/15 106.6 kg (235 lb)              We Performed the Following     TSH        Primary Care Provider    None Specified       No primary provider on file.        Equal Access to Services     [facility]: Hadii kimber hoffmano Sorishi, waaxda luqadaha, qaybta kaalmajewels rush, danuta willoughby . So [facility] [phone].    ATENCIÓN: Si habla español, tiene a guzman disposición servicios gratuitos de asistencia lingüística. Llame al [phone].    We comply with applicable federal civil rights laws and [place] laws. We do not discriminate on the basis of race, color, national origin, age, disability, sex, sexual orientation, or gender identity.            Thank you!     Thank you for choosing [facility] DERMATOLOGY  for your care. Our goal is always to provide you with excellent care. Hearing back from our patients is one way we can continue to improve our services. Please take a few minutes to  complete the written survey that you may receive in the mail after your visit with us. Thank you!             Your Updated Medication List - Protect others around you: Learn how to safely use, store and throw away your medicines at www.disposemymeds.org.          This list is accurate as of 7/9/18 11:27 AM.  Always use your most recent med list.                   Brand Name Dispense Instructions for use Diagnosis    ALBUTEROL IN      Inhale into the lungs as needed        * clobetasol propionate 0.05 % Sham      Externally apply topically 3 times daily        * clobetasol propionate 0.05 % Foam    OLUX    100 g    Apply a golf ball size of product to affected areas nightly 6 to 7 days of the week (one day off)    Alopecia areata, Autoimmune disease (H)       predniSONE 10 MG tablet    DELTASONE    112 tablet    Take 1 tablet (10 mg) by mouth every 48 hours    Alopecia areata       PRENATAL 1 PO      Take by mouth daily        promethazine 25 MG tablet    PHENERGAN     Take by mouth every 6 hours as needed for nausea        ranitidine 150 MG capsule    ZANTAC     Take by mouth 2 times daily        triamcinolone 0.1 % ointment    KENALOG    30 g    Apply to rash on the left ring finger twice per day until resolution    Allergic contact dermatitis due to metals       ZOFRAN PO      Take by mouth as needed for nausea or vomiting        * Notice:  This list has 2 medication(s) that are the same as other medications prescribed for you. Read the directions carefully, and ask your doctor or other care provider to review them with you.

## 2018-07-09 NOTE — LETTER
"7/9/2018       RE: Shea Siddiqui  [address] [place] [address]     Dear Colleague,    Thank you for referring your patient, Shea Siddiqui, to the [facility] DERMATOLOGY at [facility]. Please see a copy of my visit note below.    [facility] Dermatology Note      Dermatology Problem List:  1. Alopecia areata:    -Has responded to ILK, last administered 6/21/2018 at outside facility      2. Lichen simplex chronicus vs hypertrophic scar - left achilles     3. Contact dermatitis, L ring finger: Suspect ACD 2/2 metal    Encounter Date: Jul 9, 2018    CC:  Chief Complaint   Patient presents with     Hair Loss     Alopecia areata, Shea states \"still losing hair, new spots are appearing and seeing new growth around the area where the medication was given\"         History of Present Illness:  Ms. Shea Siddiqui is a 33 year old female who presents as a follow-up for alopecia areata. The patient was last seen 9/18/17 when she was 30 weeks pregnant and hair loss had improved with pregnancy and serial ILK injections.  Since then she has given birth 7 months ago and had increased loss at the 4 months out from delivery lety. She has been going to a dermatologist closer to home as she was unable to get an appointment with our office and received ILK injections concentrated to the superior scalp which she notes has good regrowth currently. She does state she has a few new patches of hair loss on her posterior scalp and along her hairline.     Still using the clobetasol shampoo 3-4x weekly and alternates with Biolage shampoo. She does experience scalp \"tingling\" with the clobetasol. She has tried Head&Shoulders but feels this dries her hair out.     She is not currently taking any vitamins and is breast feeding. No contraception at this time- previously experienced increased hair loss with IUD.     Past Medical History:   Patient Active Problem List "   Diagnosis     Alopecia areata     Autoimmune disease (H)     KP (keratosis pilaris)     Dermatitis, seborrheic     Skin atrophy     History reviewed. No pertinent past medical history.  History reviewed. No pertinent surgical history.    Social History:  No tobacco, currently breast feeding    Family History:  Uncle has alopecia    Medications:  Current Outpatient Prescriptions   Medication Sig Dispense Refill     ALBUTEROL IN Inhale into the lungs as needed       clobetasol propionate 0.05 % SHAM Externally apply topically 3 times daily       ranitidine (ZANTAC) 150 MG capsule Take by mouth 2 times daily       clobetasol propionate (OLUX) 0.05 % FOAM Apply a golf ball size of product to affected areas nightly 6 to 7 days of the week (one day off) (Patient not taking: Reported on 7/9/2018) 100 g 3     Ondansetron HCl (ZOFRAN PO) Take by mouth as needed for nausea or vomiting       predniSONE (DELTASONE) 10 MG tablet Take 1 tablet (10 mg) by mouth every 48 hours (Patient not taking: Reported on 7/9/2018) 112 tablet 0     Prenatal MV-Min-Fe Fum-FA-DHA (PRENATAL 1 PO) Take by mouth daily       promethazine (PHENERGAN) 25 MG tablet Take by mouth every 6 hours as needed for nausea       triamcinolone (KENALOG) 0.1 % ointment Apply to rash on the left ring finger twice per day until resolution (Patient not taking: Reported on 7/9/2018) 30 g 1     Allergies   Allergen Reactions     Ceclor [Cefaclor]      hives     Erythromycin      hives     Relafen [Nabumetone]      hives     Rocephin [Ceftriaxone]      hives         Review of Systems:  -Constitutional: The patient denies fatigue, fevers, chills, unintended weight loss, and night sweats.  -HEENT: Patient denies nonhealing oral sores.  -Skin: As above in HPI. No additional skin concerns.    Physical exam:  Vitals: /66 (BP Location: Left arm, Patient Position: Chair)  Pulse 60  GEN: This is a well developed, well-nourished female in no acute distress, in a pleasant  mood.    SKIN: Focused examination of the scalp, face and hands/lower arms was performed.  -6x8cm patch of hair loss superior to right ear  -2 2x2cm patches of hair loss to posterior scalp  -7x8cm patch of hair loss with 1cm regrowth to top of scalp, moderate perifollicular scale and atrophy of epidermis  -1 2x2cm patch of hair loss to left sided hairline by forehead  -negative hair pull test  -eyebrows and eyelashes appear intact  -No other lesions of concern on areas examined.     Impression/Plan:  1. Alopecia areata    Has had last ILK injection 6/21/18 at outside facility, pediatrician is aware of this as mom is breast feeding. Will hold off from further ILK today with atrophy on exam today and good regrowth on superior scalp area that has been injected recently. Would consider injection of areas above ears in future visits.    Continue clobetasol shampoo 2-4x weekly     Will check TSH, Vit D, ferritin labs today to rule out other causes of hair loss post-partum      Follow-up in 3 months, earlier for new or changing lesions.     Staff Involved:  Scribed by Kelsie Ayala, MS4 for Dr. Santoyo.  Answers for HPI/ROS submitted by the patient on 7/9/2018   PHQ-2 Score: 0      I agree with the PFSH and ROS as completed by the Medical Student. The remainder of the encounter was performed by me and scribed by the Medical Student. The scribed note accurately reflects my personal services and the medical decisions made by me.      Valery Santoyo MD  Professor and Chair  Department of Dermatology  [facility]    Pictures were placed in Pt's chart today for future reference.                Again, thank you for allowing me to participate in the care of your patient.      Sincerely,    Valery Santoyo MD

## 2018-10-15 ENCOUNTER — OFFICE VISIT (OUTPATIENT)
Dept: DERMATOLOGY | Facility: CLINIC | Age: 33
End: 2018-10-15
Payer: COMMERCIAL

## 2018-10-15 VITALS — DIASTOLIC BLOOD PRESSURE: 68 MMHG | SYSTOLIC BLOOD PRESSURE: 112 MMHG | HEART RATE: 53 BPM

## 2018-10-15 DIAGNOSIS — L30.9 DERMATITIS: ICD-10-CM

## 2018-10-15 DIAGNOSIS — L63.9 AA (ALOPECIA AREATA): Primary | ICD-10-CM

## 2018-10-15 DIAGNOSIS — M35.9 AUTOIMMUNE DISEASE (H): ICD-10-CM

## 2018-10-15 DIAGNOSIS — L90.9 SKIN ATROPHY: ICD-10-CM

## 2018-10-15 ASSESSMENT — PAIN SCALES - GENERAL
PAINLEVEL: NO PAIN (1)
PAINLEVEL: NO PAIN (0)

## 2018-10-15 NOTE — PROGRESS NOTES
[facility] Dermatology Note      Dermatology Problem List:  1. Alopecia areata:    -Has responded to ILK (pt is seen here and at [facility])   -ILK: 8/31/18 (at [place]), 10/15/18 (at [facility])  -Clobetasol shampoo 2-4x/week  -Labs: TPO, TSH, CBC, iron studies, and vitamin D were all WNL in July 2018      2. Lichen simplex chronicus vs hypertrophic scar - left achilles region      3. Contact dermatitis, L ring finger: Suspect ACD 2/2 metal    4. Concern for possible LPP on the frontal scalp (first noticed on 10/15/18)    Encounter Date: Oct 15, 2018    CC:   Alopecia areata follow up      History of Present Illness:  Ms. Shea Siddiqui is a 33 year old female who presents as a follow-up for alopecia areata. The patient was last seen in July 2018 when she was continued on clobetasol shampoo 2-4 times a week and had hair loss labs checked. The pt's labs were unremarkable, and she did not receive ILK at the last visit because she had received them less than 4 weeks before at [facility].    Today, the pt reports that she is doing well. She most recently had ILK at [facility] on 8/31/18. She notes that the ILK helps. Specifically, it prevents her hair loss from expanding. She denies any pain, burning, or itching on the scalp. The pt is currently breastfeeding, but will stop in November 2018. She continues to use clobetasol shampoo about three times per week. The pt admits to new hair loss on the frontal hair line, but her longstanding areas of AA on the temporal scalp are stable and slightly worse. The pt denies any new hair loss anywhere else on the body    Overall, the pt states she feels well, and she denies any other general or skin-specific complaints at this time.    Past Medical History:   Patient Active Problem List   Diagnosis     Alopecia areata     Autoimmune disease (H)     KP (keratosis pilaris)     Dermatitis, seborrheic     Skin atrophy     No past medical history on  file.  No past surgical history on file.    Social History:   reports that she has never smoked. She has never used smokeless tobacco.    Family History:  Family History   Problem Relation Age of Onset     Cancer Paternal Grandmother      skin cancer     Skin Cancer Paternal Grandmother      Skin Cancer Maternal Grandmother        Medications:  Current Outpatient Prescriptions   Medication Sig Dispense Refill     ALBUTEROL IN Inhale into the lungs as needed       clobetasol propionate (OLUX) 0.05 % FOAM Apply a golf ball size of product to affected areas nightly 6 to 7 days of the week (one day off) (Patient not taking: Reported on 7/9/2018) 100 g 3     clobetasol propionate 0.05 % SHAM Externally apply topically 3 times daily       Ondansetron HCl (ZOFRAN PO) Take by mouth as needed for nausea or vomiting       predniSONE (DELTASONE) 10 MG tablet Take 1 tablet (10 mg) by mouth every 48 hours (Patient not taking: Reported on 7/9/2018) 112 tablet 0     Prenatal MV-Min-Fe Fum-FA-DHA (PRENATAL 1 PO) Take by mouth daily       promethazine (PHENERGAN) 25 MG tablet Take by mouth every 6 hours as needed for nausea       ranitidine (ZANTAC) 150 MG capsule Take by mouth 2 times daily       triamcinolone (KENALOG) 0.1 % ointment Apply to rash on the left ring finger twice per day until resolution (Patient not taking: Reported on 7/9/2018) 30 g 1        Allergies   Allergen Reactions     Ceclor [Cefaclor]      hives     Erythromycin      hives     Relafen [Nabumetone]      hives     Rocephin [Ceftriaxone]      hives         Review of Systems:  -As per HPI      Physical exam:  Vitals: There were no vitals taken for this visit.  GEN: This is a well developed, well-nourished female in no acute distress, in a pleasant mood.    SKIN: Focused examination of the scalp, face, neck, right and left hands, and finger nails was performed.  -Profound, even nail pitting of most finger nails  -Slightly shiny, atrophi skin on the frontal  hairline without involvement of the hair line at the temples. There is background redness of the frontal and vertex scalp with mild perifollicular scale  -Two distinct of areas of hair loss: one one each side of the temporal scalp. There are no islands of hair regrowth, and there is no redness of scale in these areas  -Mild areas of decreased hair density along the frontal part line  -A focal area of skin atrophy on the right mid-scalp  -No other lesions of concern on areas examined.     Impression/Plan:  1. Alopecia areata    At this time, the pt's dz is stable with ILK and three times per week clobetasol, however, she has no islands of hair regrowth on the areas involved on the temporal scalp. Given that the pt does have atrophy on the scalp, we must balance how aggressive we are.     Continue clobetasol shampoo three times per week. Encouraged her to do ILK at [facility] in 6 weeks, and see us again in 12 weeks    Kenalog intralesional injection procedure note: After verbal consent and discussion of risks including but not limited to atrophy, pain, and bruising, time out was performed, the patient underwent positioning, 3 total cc of Kenalog 10 mg/cc was injected into 30 site(s) on the scalp.  The patient tolerated the procedure well and left the Dermatology clinic in good condition.      2. Concern for LPP    Pt has noticed new hair loss ont he frontal hair line. Given that the skin is slightly atrophic, and that she has redness with perifollicular scale on the frontal hair line, we cannot exclude LPP or early FFA at this time. We injected the frontal hairline today (see above procedure note). Told pt to stop clobetasol to this area. If this continues to worsen, consider a bx in the future          Follow-up in 3 months, earlier for new or changing lesions.       Dr. Santoyo staffed the patient.    Staff Involved:  Resident(Aroldo Astudillo)/Staff(as above)      Patient was seen and examined with the  dermatology resident. I agree with the history, review of systems, physical examination, assessments and plan. ILK injections were done together.    Valery Santoyo MD  Professor and  Chair  Department of Dermatology  [facility]

## 2018-10-15 NOTE — NURSING NOTE
Drug Administration Record    Drug Name: triamcinolone acetonide(kenalog)  Dose: 3mL of triamcinolone 10mg/mL, 30mg dose  Route administered: ID  NDC #: Kenalog-10 (9325-3718-40)  Amount of waste(mL):1  Reason for waste: Single use vial    LOT #: [id number]  SITE: [facility]  : [employer]  EXPIRATION DATE: FEB2020

## 2018-10-15 NOTE — LETTER
10/15/2018       RE: Shea Siddiqui  [address]  [place] [address]     Dear Colleague,    Thank you for referring your patient, Shea Siddiqui, to the [facility] DERMATOLOGY at [facility] Please see a copy of my visit note below.    [facility] Dermatology Note      Dermatology Problem List:  1. Alopecia areata:    -Has responded to ILK (pt is seen here and at [facility])   -ILK: 8/31/18 (at [place]), 10/15/18 (at [facility])  -Clobetasol shampoo 2-4x/week  -Labs: TPO, TSH, CBC, iron studies, and vitamin D were all WNL in July 2018      2. Lichen simplex chronicus vs hypertrophic scar - left achilles region      3. Contact dermatitis, L ring finger: Suspect ACD 2/2 metal    4. Concern for possible LPP on the frontal scalp (first noticed on 10/15/18)    Encounter Date: Oct 15, 2018    CC:   Alopecia areata follow up      History of Present Illness:  Ms. Shea Siddiqui is a 33 year old female who presents as a follow-up for alopecia areata. The patient was last seen in July 2018 when she was continued on clobetasol shampoo 2-4 times a week and had hair loss labs checked. The pt's labs were unremarkable, and she did not receive ILK at the last visit because she had received them less than 4 weeks before at [facility].    Today, the pt reports that she is doing well. She most recently had ILK at [facility] on 8/31/18. She notes that the ILK helps. Specifically, it prevents her hair loss from expanding. She denies any pain, burning, or itching on the scalp. The pt is currently breastfeeding, but will stop in November 2018. She continues to use clobetasol shampoo about three times per week. The pt admits to new hair loss on the frontal hair line, but her longstanding areas of AA on the temporal scalp are stable and slightly worse. The pt denies any new hair loss anywhere else on the body    Overall, the pt states she feels well, and she  denies any other general or skin-specific complaints at this time.    Past Medical History:   Patient Active Problem List   Diagnosis     Alopecia areata     Autoimmune disease (H)     KP (keratosis pilaris)     Dermatitis, seborrheic     Skin atrophy     No past medical history on file.  No past surgical history on file.    Social History:   reports that she has never smoked. She has never used smokeless tobacco.    Family History:  Family History   Problem Relation Age of Onset     Cancer Paternal Grandmother      skin cancer     Skin Cancer Paternal Grandmother      Skin Cancer Maternal Grandmother        Medications:  Current Outpatient Prescriptions   Medication Sig Dispense Refill     ALBUTEROL IN Inhale into the lungs as needed       clobetasol propionate (OLUX) 0.05 % FOAM Apply a golf ball size of product to affected areas nightly 6 to 7 days of the week (one day off) (Patient not taking: Reported on 7/9/2018) 100 g 3     clobetasol propionate 0.05 % SHAM Externally apply topically 3 times daily       Ondansetron HCl (ZOFRAN PO) Take by mouth as needed for nausea or vomiting       predniSONE (DELTASONE) 10 MG tablet Take 1 tablet (10 mg) by mouth every 48 hours (Patient not taking: Reported on 7/9/2018) 112 tablet 0     Prenatal MV-Min-Fe Fum-FA-DHA (PRENATAL 1 PO) Take by mouth daily       promethazine (PHENERGAN) 25 MG tablet Take by mouth every 6 hours as needed for nausea       ranitidine (ZANTAC) 150 MG capsule Take by mouth 2 times daily       triamcinolone (KENALOG) 0.1 % ointment Apply to rash on the left ring finger twice per day until resolution (Patient not taking: Reported on 7/9/2018) 30 g 1        Allergies   Allergen Reactions     Ceclor [Cefaclor]      hives     Erythromycin      hives     Relafen [Nabumetone]      hives     Rocephin [Ceftriaxone]      hives         Review of Systems:  -As per HPI      Physical exam:  Vitals: There were no vitals taken for this visit.  GEN: This is a well  developed, well-nourished female in no acute distress, in a pleasant mood.    SKIN: Focused examination of the scalp, face, neck, right and left hands, and finger nails was performed.  -Profound, even nail pitting of most finger nails  -Slightly shiny, atrophi skin on the frontal hairline without involvement of the hair line at the temples. There is background redness of the frontal and vertex scalp with mild perifollicular scale  -Two distinct of areas of hair loss: one one each side of the temporal scalp. There are no islands of hair regrowth, and there is no redness of scale in these areas  -Mild areas of decreased hair density along the frontal part line  -A focal area of skin atrophy on the right mid-scalp  -No other lesions of concern on areas examined.     Impression/Plan:  1. Alopecia areata    At this time, the pt's dz is stable with ILK and three times per week clobetasol, however, she has no islands of hair regrowth on the areas involved on the temporal scalp. Given that the pt does have atrophy on the scalp, we must balance how aggressive we are.     Continue clobetasol shampoo three times per week. Encouraged her to do ILK at [facility] in 6 weeks, and see us again in 12 weeks    Kenalog intralesional injection procedure note: After verbal consent and discussion of risks including but not limited to atrophy, pain, and bruising, time out was performed, the patient underwent positioning, 3 total cc of Kenalog 10 mg/cc was injected into 30 site(s) on the scalp.  The patient tolerated the procedure well and left the Dermatology clinic in good condition.      2. Concern for LPP    Pt has noticed new hair loss ont he frontal hair line. Given that the skin is slightly atrophic, and that she has redness with perifollicular scale on the frontal hair line, we cannot exclude LPP or early FFA at this time. We injected the frontal hairline today (see above procedure note). Told pt to stop clobetasol to this  area. If this continues to worsen, consider a bx in the future          Follow-up in 3 months, earlier for new or changing lesions.       Dr. Santoyo staffed the patient.    Staff Involved:  Resident(Aroldo Astudillo)/Staff(as above)      Patient was seen and examined with the dermatology resident. I agree with the history, review of systems, physical examination, assessments and plan. ILK injections were done together.    Valery Santoyo MD  Professor and  Chair  Department of Dermatology  [facility]                                                              Again, thank you for allowing me to participate in the care of your patient.      Sincerely,    Valery Santoyo MD

## 2018-10-15 NOTE — NURSING NOTE
"Dermatology Rooming Note    Shea Siddiqui's goals for this visit include:   Chief Complaint   Patient presents with     Derm Problem     A A , Shea states \" I have some re growth abut some new spots.\"      Olinda Zapata LPN   "

## 2018-10-15 NOTE — PATIENT INSTRUCTIONS
1. Do not apply the clobetasol shampoo to the frontal hair line, but continue on the rest of the scalp. Do this three times a week    2. In 6 weeks, go back [facility] for more [facility], and we will see you here in 12 weeks

## 2018-10-15 NOTE — PROGRESS NOTES
[facility] Dermatology Note      Dermatology Problem List:  1. Alopecia areata:    -Has responded to ILK, last administered 6/21/2018 at outside facility; did not inject in July 2018 because she was breastfeeding  -Clobetasol shampoo  -Labs: TPO, iron, ferritin, vitamin D, and TSH were WNL      2. Lichen simplex chronicus vs hypertrophic scar - left achilles      3. Contact dermatitis, L ring finger: Suspect ACD 2/2 metal    Encounter Date: Oct 15, 2018    CC:   No chief complaint on file.        History of Present Illness:  Ms. Shea Siddiqui is a 33 year old female who {hpi:296689}    Past Medical History:   Patient Active Problem List   Diagnosis     Alopecia areata     Autoimmune disease (H)     KP (keratosis pilaris)     Dermatitis, seborrheic     Skin atrophy     No past medical history on file.  No past surgical history on file.    Social History:   reports that she has never smoked. She has never used smokeless tobacco.    Family History:  Family History   Problem Relation Age of Onset     Cancer Paternal Grandmother      skin cancer     Skin Cancer Paternal Grandmother      Skin Cancer Maternal Grandmother        Medications:  Current Outpatient Prescriptions   Medication Sig Dispense Refill     ALBUTEROL IN Inhale into the lungs as needed       clobetasol propionate (OLUX) 0.05 % FOAM Apply a golf ball size of product to affected areas nightly 6 to 7 days of the week (one day off) (Patient not taking: Reported on 7/9/2018) 100 g 3     clobetasol propionate 0.05 % SHAM Externally apply topically 3 times daily       Ondansetron HCl (ZOFRAN PO) Take by mouth as needed for nausea or vomiting       predniSONE (DELTASONE) 10 MG tablet Take 1 tablet (10 mg) by mouth every 48 hours (Patient not taking: Reported on 7/9/2018) 112 tablet 0     Prenatal MV-Min-Fe Fum-FA-DHA (PRENATAL 1 PO) Take by mouth daily       promethazine (PHENERGAN) 25 MG tablet Take by mouth every 6 hours as needed for  nausea       ranitidine (ZANTAC) 150 MG capsule Take by mouth 2 times daily       triamcinolone (KENALOG) 0.1 % ointment Apply to rash on the left ring finger twice per day until resolution (Patient not taking: Reported on 7/9/2018) 30 g 1        Allergies   Allergen Reactions     Ceclor [Cefaclor]      hives     Erythromycin      hives     Relafen [Nabumetone]      hives     Rocephin [Ceftriaxone]      hives         Review of Systems:  -{ROS:333220}  -Constitutional: The patient denies fatigue, fevers, chills, unintended weight loss, and night sweats.  -HEENT: Patient denies nonhealing oral sores.  -Skin: As above in HPI. No additional skin concerns.    Physical exam:  Vitals: There were no vitals taken for this visit.  GEN: {RFGeneralAppearance:002969}   SKIN: {Skin Exam:599468}  -{Skin Exam Derm:387157}  -{Skin Exam Derm:828339}  -{Skin Exam Derm:747191}  -No other lesions of concern on areas examined.     Impression/Plan:  1. {Diagnosesderm:855794}    {rfplan:559508}    ***    2. {Diagnosesderm:450394}    {rfplan:474283}    ***    3. {Diagnosesderm:961200}    {rfplan:743949}    ***    4. {Diagnosesderm:947908}    ***    ***    CC  *** on close of this encounter.  Follow-up {rfmultiple:527064} {follow up in days/weeks/months:015720}.       *** staffed the patient.    Staff Involved:  Resident(Aroldo Astudillo)/Staff(as above)

## 2018-10-15 NOTE — MR AVS SNAPSHOT
After Visit Summary   10/15/2018    Shea Siddiqui    MRN: [id number]           Patient Information     Date Of Birth          1985        Visit Information        Provider Department      10/15/2018 10:30 AM Valery Santoyo MD [facility] Dermatology        Care Instructions    1. Do not apply the clobetasol shampoo to the frontal hair line, but continue on the rest of the scalp. Do this three times a week    2. In 6 weeks, go back [facility] more [facility], and we will see you here in 12 weeks          Follow-ups after your visit        Your next 10 appointments already scheduled     Feb 05, 2019  3:30 PM CST   (Arrive by 3:15 PM)   RETURN HAIRLOSS with Valery Santoyo MD   [facility] Dermatology ([facility])    [address] [place] [id number] [phone]              Who to contact     Please call your clinic at [phone] to:    Ask questions about your health    Make or cancel appointments    Discuss your medicines    Learn about your test results    Speak to your doctor            Additional Information About Your Visit        MyChart Information     [employer] gives you secure access to your electronic health record. If you see a primary care provider, you can also send messages to your care team and make appointments. If you have questions, please call your primary care clinic.  If you do not have a primary care provider, please call [phone] and they will assist you.      [employer] is an electronic gateway that provides easy, online access to your medical records. With [employer], you can request a clinic appointment, read your test results, renew a prescription or communicate with your care team.     To access your existing account, please contact your [facility] or call [phone] for assistance.        Care EveryWhere ID     This is your Care EveryWhere ID. This could be used  by other organizations to access your [place] medical records  [id number]        Your Vitals Were     Pulse                   53            Blood Pressure from Last 3 Encounters:   10/15/18 112/68   07/09/18 110/66   09/18/17 116/62    Weight from Last 3 Encounters:   12/29/15 106.6 kg (235 lb)              Today, you had the following     No orders found for display       Primary Care Provider    None Specified       No primary provider on file.        Equal Access to Services     SANDRA BRYAN : Hadii kimber ca Sorishi, waquynhda franklinadaha, qaybta kaalmada israeljeanjewels, danuta berger jamessandy sahusrielo willoughby . So [facility] [phone].    ATENCIÓN: Si habla español, tiene a guzman disposición servicios gratuitos de asistencia lingüística. Melviname al [phone].    We comply with applicable federal civil rights laws and [place] laws. We do not discriminate on the basis of race, color, national origin, age, disability, sex, sexual orientation, or gender identity.            Thank you!     Thank you for choosing [facility] DERMATOLOGY  for your care. Our goal is always to provide you with excellent care. Hearing back from our patients is one way we can continue to improve our services. Please take a few minutes to complete the written survey that you may receive in the mail after your visit with us. Thank you!             Your Updated Medication List - Protect others around you: Learn how to safely use, store and throw away your medicines at www.disposemymeds.org.          This list is accurate as of 10/15/18 11:35 AM.  Always use your most recent med list.                   Brand Name Dispense Instructions for use Diagnosis    ALBUTEROL IN      Inhale into the lungs as needed        * clobetasol propionate 0.05 % Sham      Externally apply topically 3 times daily        * clobetasol propionate 0.05 % Foam    OLUX    100 g    Apply a golf ball size of product to affected areas nightly 6 to 7 days of the week (one day off)     Alopecia areata, Autoimmune disease (H)       predniSONE 10 MG tablet    DELTASONE    112 tablet    Take 1 tablet (10 mg) by mouth every 48 hours    Alopecia areata       PRENATAL 1 PO      Take by mouth daily        promethazine 25 MG tablet    PHENERGAN     Take by mouth every 6 hours as needed for nausea        ranitidine 150 MG capsule    ZANTAC     Take by mouth 2 times daily        triamcinolone 0.1 % ointment    KENALOG    30 g    Apply to rash on the left ring finger twice per day until resolution    Allergic contact dermatitis due to metals       ZOFRAN PO      Take by mouth as needed for nausea or vomiting        * Notice:  This list has 2 medication(s) that are the same as other medications prescribed for you. Read the directions carefully, and ask your doctor or other care provider to review them with you.

## 2018-11-16 NOTE — NURSING NOTE
"Dermatology Rooming Note    Shea Siddiqui's goals for this visit include:   Chief Complaint   Patient presents with     Hair Loss     Shea comes to clinic today for Alopecia areata. States \"it's the same.\"     Tisha Ernst, [facility]    " Current some day smoker

## 2019-05-31 ENCOUNTER — OFFICE VISIT (OUTPATIENT)
Dept: DERMATOLOGY | Facility: CLINIC | Age: 34
End: 2019-05-31
Payer: COMMERCIAL

## 2019-05-31 VITALS — DIASTOLIC BLOOD PRESSURE: 70 MMHG | HEART RATE: 64 BPM | SYSTOLIC BLOOD PRESSURE: 117 MMHG

## 2019-05-31 DIAGNOSIS — M35.9 AUTOIMMUNE DISEASE (H): ICD-10-CM

## 2019-05-31 DIAGNOSIS — L63.9 ALOPECIA AREATA: Primary | ICD-10-CM

## 2019-05-31 DIAGNOSIS — L63.9 ALOPECIA AREATA: ICD-10-CM

## 2019-05-31 DIAGNOSIS — L21.9 DERMATITIS, SEBORRHEIC: ICD-10-CM

## 2019-05-31 LAB
BASOPHILS # BLD AUTO: 0.1 10E9/L (ref 0–0.2)
BASOPHILS NFR BLD AUTO: 0.8 %
DIFFERENTIAL METHOD BLD: NORMAL
EOSINOPHIL # BLD AUTO: 0.4 10E9/L (ref 0–0.7)
EOSINOPHIL NFR BLD AUTO: 4.7 %
ERYTHROCYTE [DISTWIDTH] IN BLOOD BY AUTOMATED COUNT: 12.8 % (ref 10–15)
FERRITIN SERPL-MCNC: 33 NG/ML (ref 12–150)
HCT VFR BLD AUTO: 39.4 % (ref 35–47)
HGB BLD-MCNC: 12.4 G/DL (ref 11.7–15.7)
IMM GRANULOCYTES # BLD: 0 10E9/L (ref 0–0.4)
IMM GRANULOCYTES NFR BLD: 0.3 %
IRON SATN MFR SERPL: 26 % (ref 15–46)
IRON SERPL-MCNC: 82 UG/DL (ref 35–180)
LYMPHOCYTES # BLD AUTO: 2.3 10E9/L (ref 0.8–5.3)
LYMPHOCYTES NFR BLD AUTO: 29.6 %
MCH RBC QN AUTO: 28.4 PG (ref 26.5–33)
MCHC RBC AUTO-ENTMCNC: 31.5 G/DL (ref 31.5–36.5)
MCV RBC AUTO: 90 FL (ref 78–100)
MONOCYTES # BLD AUTO: 0.4 10E9/L (ref 0–1.3)
MONOCYTES NFR BLD AUTO: 5.5 %
NEUTROPHILS # BLD AUTO: 4.6 10E9/L (ref 1.6–8.3)
NEUTROPHILS NFR BLD AUTO: 59.1 %
NRBC # BLD AUTO: 0 10*3/UL
NRBC BLD AUTO-RTO: 0 /100
PLATELET # BLD AUTO: 317 10E9/L (ref 150–450)
PROT SERPL-MCNC: 7.2 G/DL (ref 6.8–8.8)
RBC # BLD AUTO: 4.36 10E12/L (ref 3.8–5.2)
TIBC SERPL-MCNC: 312 UG/DL (ref 240–430)
WBC # BLD AUTO: 7.8 10E9/L (ref 4–11)

## 2019-05-31 ASSESSMENT — PAIN SCALES - GENERAL: PAINLEVEL: NO PAIN (0)

## 2019-05-31 NOTE — NURSING NOTE
Chief Complaint   Patient presents with     Hair Loss     Shea is here today for a 6 month alopecia follow up. She states that her hair loss is getting worse.        Cassandra Gaffney, CMA

## 2019-05-31 NOTE — PROGRESS NOTES
"[facility] Dermatology Note      Dermatology Problem List:  1.Alopecia areata:  active today - therefore, recommend a short prednisone taper - see note  -Has responded to ILK (pt is seen here and at [facility])               -ILK: 8/31/18 (at [facility]), 10/15/18 (at [facility])   -Clobetasol shampoo 2-4x/week  -Labs: CBC, iron studies,zinc, protein and vitamin D today      2. History of lichen simplex chronicus vs hypertrophic scar - left achilles region      3. History of contact dermatitis, L ring finger: Suspect ACD 2/2 metal     4. Concern for possible LPP on the frontal scalp (first noticed on 10/15/18) not an issue today 5/31/19    CC:   Chief Complaint   Patient presents with     Hair Loss     Shea is here today for a 6 month alopecia follow up. She states that her hair loss is getting worse.          Encounter Date: May 31, 2019    History of Present Illness:  Ms. Shea Siddiqui is a 34 year old female who returns for follow-up primarily of her scalp alopecia areata.  She notes it is slightly active. Ms. Siddiqui also notes that since delivering her baby, her periods are now back to normal, but concurrently Shea reports having loose stools the past 2 weeks. She notes her periods are initially heavy. Recommended labs have not been fully completed - related to a lot of \"stress\" at the time with buying a new home, moving.  Will plan to do these today.       Past Medical History:   Patient Active Problem List   Diagnosis     Alopecia areata     Autoimmune disease (H)     KP (keratosis pilaris)     Dermatitis, seborrheic     Skin atrophy     History reviewed. No pertinent past medical history.  History reviewed. No pertinent surgical history.    Social History:  Patient reports that she has never smoked. She has never used smokeless tobacco.    Family History:  Family History   Problem Relation Age of Onset     Cancer Paternal Grandmother         skin cancer     Skin Cancer Paternal " Grandmother      Skin Cancer Maternal Grandmother        Medications:  Current Outpatient Medications   Medication Sig Dispense Refill     ALBUTEROL IN Inhale into the lungs as needed       clobetasol propionate 0.05 % SHAM Externally apply topically 3 times daily       Ondansetron HCl (ZOFRAN PO) Take by mouth as needed for nausea or vomiting       Prenatal MV-Min-Fe Fum-FA-DHA (PRENATAL 1 PO) Take by mouth daily       promethazine (PHENERGAN) 25 MG tablet Take by mouth every 6 hours as needed for nausea       ranitidine (ZANTAC) 150 MG capsule Take by mouth 2 times daily       clobetasol propionate (OLUX) 0.05 % FOAM Apply a golf ball size of product to affected areas nightly 6 to 7 days of the week (one day off) (Patient not taking: Reported on 7/9/2018) 100 g 3     predniSONE (DELTASONE) 10 MG tablet Take 1 tablet (10 mg) by mouth every 48 hours (Patient not taking: Reported on 7/9/2018) 112 tablet 0     triamcinolone (KENALOG) 0.1 % ointment Apply to rash on the left ring finger twice per day until resolution (Patient not taking: Reported on 7/9/2018) 30 g 1     Allergies   Allergen Reactions     Ceclor [Cefaclor]      hives     Erythromycin      hives     Relafen [Nabumetone]      hives     Rocephin [Ceftriaxone]      hives         Review of Systems:  -Constitutional: Otherwise feeling well today, in usual state of health.  -HEENT: Patient denies nonhealing oral sores.  -Skin: As above in HPI. No additional skin concerns.    Physical exam:  Vitals: /70 (BP Location: Right arm, Patient Position: Chair, Cuff Size: Adult Regular)   Pulse 64   GEN: This is a well developed, well-nourished female in no acute distress, in a pleasant mood.    SKIN: Sun-exposed skin, which includes the head/face, neck, both arms, digits, and/or nails was examined.   -Positive hair pull tests on scalp  -Focal patches of non-scarring alopecia in the ophiasis region, parietal scalp, R greater than L and vertex regionn  - mild  scale present  -mild fingernail pitting noted  -No other lesions of concern on areas examined.     Impression/Plan:  1. Alopecia areata, active    If GI symptoms resolve and labs requested today - see below - are normal, recommend a short prednisone course to halt disease activity  5 mg tablets - 6 pills daily for 5 days, then 4 pillls dailly for 5 days then 2 pill daily for 5 days then one tablet for a week, Disp-60 tablet, R-0, E-Prescribe              Labs today including cbc with diff, vitamin D, protein and iron studies    Encourage use of Clobex shampoo at least 2-3 times per week    Follow-up one month    Valery Santoyo MD  Professor and Chair  Department of Dermatology  [facility]

## 2019-05-31 NOTE — LETTER
"5/31/2019       RE: Shea Siddiqui  [address] [place] [address]     Dear Colleague,    Thank you for referring your patient, Shea Siddiqui, to the [facility] DERMATOLOGY at [facility]. Please see a copy of my visit note below.    [facility] Dermatology Note      Dermatology Problem List:  1.Alopecia areata:  active today - therefore, recommend a short prednisone taper - see note  -Has responded to ILK (pt is seen here and at [facility])               -ILK: 8/31/18 (at [facility]), 10/15/18 (at [facility])   -Clobetasol shampoo 2-4x/week  -Labs: CBC, iron studies,zinc, protein and vitamin D today      2. History of lichen simplex chronicus vs hypertrophic scar - left achilles region      3. History of contact dermatitis, L ring finger: Suspect ACD 2/2 metal     4. Concern for possible LPP on the frontal scalp (first noticed on 10/15/18) not an issue today 5/31/19    CC:   Chief Complaint   Patient presents with     Hair Loss     Shea is here today for a 6 month alopecia follow up. She states that her hair loss is getting worse.          Encounter Date: May 31, 2019    History of Present Illness:  Ms. Shea Siddiqui is a 34 year old female who returns for follow-up primarily of her scalp alopecia areata.  She notes it is slightly active. Ms. Siddiqui also notes that since delivering her baby, her periods are now back to normal, but concurrently Shea reports having loose stools the past 2 weeks. She notes her periods are initially heavy. Recommended labs have not been fully completed - related to a lot of \"stress\" at the time with buying a new home, moving.  Will plan to do these today.       Past Medical History:   Patient Active Problem List   Diagnosis     Alopecia areata     Autoimmune disease (H)     KP (keratosis pilaris)     Dermatitis, seborrheic     Skin atrophy     History reviewed. No pertinent past medical history.  History " reviewed. No pertinent surgical history.    Social History:  Patient reports that she has never smoked. She has never used smokeless tobacco.    Family History:  Family History   Problem Relation Age of Onset     Cancer Paternal Grandmother         skin cancer     Skin Cancer Paternal Grandmother      Skin Cancer Maternal Grandmother        Medications:  Current Outpatient Medications   Medication Sig Dispense Refill     ALBUTEROL IN Inhale into the lungs as needed       clobetasol propionate 0.05 % SHAM Externally apply topically 3 times daily       Ondansetron HCl (ZOFRAN PO) Take by mouth as needed for nausea or vomiting       Prenatal MV-Min-Fe Fum-FA-DHA (PRENATAL 1 PO) Take by mouth daily       promethazine (PHENERGAN) 25 MG tablet Take by mouth every 6 hours as needed for nausea       ranitidine (ZANTAC) 150 MG capsule Take by mouth 2 times daily       clobetasol propionate (OLUX) 0.05 % FOAM Apply a golf ball size of product to affected areas nightly 6 to 7 days of the week (one day off) (Patient not taking: Reported on 7/9/2018) 100 g 3     predniSONE (DELTASONE) 10 MG tablet Take 1 tablet (10 mg) by mouth every 48 hours (Patient not taking: Reported on 7/9/2018) 112 tablet 0     triamcinolone (KENALOG) 0.1 % ointment Apply to rash on the left ring finger twice per day until resolution (Patient not taking: Reported on 7/9/2018) 30 g 1     Allergies   Allergen Reactions     Ceclor [Cefaclor]      hives     Erythromycin      hives     Relafen [Nabumetone]      hives     Rocephin [Ceftriaxone]      hives         Review of Systems:  -Constitutional: Otherwise feeling well today, in usual state of health.  -HEENT: Patient denies nonhealing oral sores.  -Skin: As above in HPI. No additional skin concerns.    Physical exam:  Vitals: /70 (BP Location: Right arm, Patient Position: Chair, Cuff Size: Adult Regular)   Pulse 64   GEN: This is a well developed, well-nourished female in no acute distress, in a  pleasant mood.    SKIN: Sun-exposed skin, which includes the head/face, neck, both arms, digits, and/or nails was examined.   -Positive hair pull tests on scalp  -Focal patches of non-scarring alopecia in the ophiasis region, parietal scalp, R greater than L and vertex regionn  - mild scale present  -mild fingernail pitting noted  -No other lesions of concern on areas examined.     Impression/Plan:  1. Alopecia areata, active    If GI symptoms resolve and labs requested today - see below - are normal, recommend a short prednisone course to halt disease activity  5 mg tablets - 6 pills daily for 5 days, then 4 pillls dailly for 5 days then 2 pill daily for 5 days then one tablet for a week, Disp-60 tablet, R-0, E-Prescribe              Labs today including cbc with diff, vitamin D, protein and iron studies    Encourage use of Clobex shampoo at least 2-3 times per week    Follow-up one month    Valery Santoyo MD  Professor and Chair  Department of Dermatology  [facility]

## 2019-06-03 RX ORDER — PREDNISONE 5 MG/1
TABLET ORAL
Qty: 60 TABLET | Refills: 0 | Status: SHIPPED | OUTPATIENT
Start: 2019-06-03 | End: 2019-08-09

## 2019-06-04 LAB — ZINC SERPL-MCNC: 66.2 UG/DL (ref 60–120)

## 2019-06-12 ENCOUNTER — TELEPHONE (OUTPATIENT)
Dept: DERMATOLOGY | Facility: CLINIC | Age: 34
End: 2019-06-12

## 2019-06-12 DIAGNOSIS — L63.9 ALOPECIA AREATA: Primary | ICD-10-CM

## 2019-06-12 RX ORDER — PREDNISONE 10 MG/1
TABLET ORAL
Qty: 46 TABLET | Refills: 0 | Status: SHIPPED | OUTPATIENT
Start: 2019-06-12

## 2019-06-12 NOTE — TELEPHONE ENCOUNTER
Provider is out of clinic this week. Will route to VANESSA for further assistance. Left detailed msg for pt letting her know to expect a call from our VANESSA and to contact clinic if she does not hear back by this time tomorrow.

## 2019-06-12 NOTE — TELEPHONE ENCOUNTER
"Patient was seen one week ago and started 30 mg prednisone. Shea says she has been tapering and clumps of hair have been falling out when she runs her hands through her hair. She said she is \"trying not to panic, but is panicking.\"   Patient wants to know if this is normal when tapering for condition to worsen before getting better. Would like advise on how to proceed. I let her know I would forward this on to Dr. Santoyo for advise, as well as the resident on call.     Pt would like detailed voicemail of plan if she doesn't answer.     "

## 2019-06-13 NOTE — TELEPHONE ENCOUNTER
Discussed with Dr. Santoyo via email, and we decided to increase her prednisone dose back to 30 mg and taper down more slowly this time (30 mg daily for 7 days, 20 mg daily for 7 days, 10 mg daily for 7 days, then 5 mg daily for 7 days). Called patient and left a detailed voicemail with this plan, and prescription sent to pharmacy. I let her know to keep us updated on how she is doing; if her hair loss still continues to worsen she should let us know- Dr. Santoyo will be back in town at that time and advise on other treatment options.     Mya Ge MD  Dermatology Resident PGY3

## 2019-06-20 NOTE — TELEPHONE ENCOUNTER
[facility] Call Center    Phone Message    May a detailed message be left on voicemail: yes    Reason for Call: Other: PT states she is still losing her hair after increasing her prednisone and tapering down slowly.  PT is requesting a call back with next steps and states a detailed voicemail may be left on her cell if she doesn't answer.  Thank you.     Action Taken: Message routed to:  [facility] ([facility] Derm

## 2019-06-20 NOTE — TELEPHONE ENCOUNTER
Patient noted hairloss is not worsening but she continues to lose hair. It has lessened from when she re-started back on the 30mg. Since it continues she is wondering what she should do. She is asking if she should continue on the taper down to 20mg or if she should continue to take 30mg. Today would be day one of the 20mg tab.    Writer will route to VANESSA in providers absence and also send message to provider as Dr Ge stated Dr Santoyo would come up with plan when returning if hair loss continues.

## 2019-06-20 NOTE — TELEPHONE ENCOUNTER
Writer spoke with Dr Santoyo and she recommended pt to take 30mg alternating with25 until we see her.    She would like her to be seen in clinic on Tuesday but Shea will be unavailable until Thursday. Writer will route to Landon in scheduling to assist.

## 2019-06-29 RX ORDER — CLOBETASOL PROPIONATE 0.05 G/100ML
SHAMPOO TOPICAL
Qty: 60 ML | Refills: 1 | Status: SHIPPED | OUTPATIENT
Start: 2019-06-29 | End: 2020-04-27

## 2019-07-01 ENCOUNTER — OFFICE VISIT (OUTPATIENT)
Dept: DERMATOLOGY | Facility: CLINIC | Age: 34
End: 2019-07-01
Payer: COMMERCIAL

## 2019-07-01 VITALS — DIASTOLIC BLOOD PRESSURE: 77 MMHG | HEART RATE: 62 BPM | SYSTOLIC BLOOD PRESSURE: 131 MMHG

## 2019-07-01 DIAGNOSIS — Z51.81 MEDICATION MONITORING ENCOUNTER: ICD-10-CM

## 2019-07-01 DIAGNOSIS — L63.9 ALOPECIA AREATA: Primary | ICD-10-CM

## 2019-07-01 DIAGNOSIS — M35.9 AUTOIMMUNE DISEASE (H): ICD-10-CM

## 2019-07-01 RX ORDER — PREDNISONE 10 MG/1
30 TABLET ORAL DAILY
Qty: 114 EACH | Refills: 0 | Status: SHIPPED | OUTPATIENT
Start: 2019-07-02 | End: 2019-08-09

## 2019-07-01 ASSESSMENT — PAIN SCALES - GENERAL: PAINLEVEL: NO PAIN (0)

## 2019-07-01 NOTE — NURSING NOTE
"Dermatology Rooming Note    Shea Siddiqui's goals for this visit include:   Chief Complaint   Patient presents with     Hair Loss     Shea is here today to be seen for hair loss- Shea states \"it's increased\".      Chayo Jackson, RMALBARO     "

## 2019-07-01 NOTE — PROGRESS NOTES
Pictures were placed in Pt's chart today for future reference.

## 2019-07-01 NOTE — PROGRESS NOTES
"[facility] Dermatology Note      Dermatology Problem List:  1.Alopecia areata: does not appear to be active today, although patient reported increased activity when tapering prednisone- will start on a longer prednisone taper and f/u w/ patient on 9/9   -Has responded to ILK in the past (pt is seen here and at [facility])               -ILK: 8/31/18 (at [facility]), 10/15/18 (at [facility])   -Clobetasol shampoo 2-4x/week       2. History of lichen simplex chronicus vs hypertrophic scar - left achilles region      3. History of contact dermatitis, L ring finger: Suspect ACD 2/2 metal     4. Concern for possible LPP on the frontal scalp (first noticed on 10/15/18), not an issue today 7/1/19    CC:   Chief Complaint   Patient presents with     Hair Loss     Shea is here today to be seen for hair loss- Shea states \"it's increased\".      Encounter Date: Jul 1, 2019    History of Present Illness:  Ms. Shea Siddiqui is a 34 year old female who returns for follow-up primarily of her scalp alopecia areata. She was seen on 5/31 for this and was noted to have active disease w/ + pull test. She was started on a prednisone taper starting at 30mg every day for five days, followed by 20mg for five days. However, she noticed that her hair started falling out in clumps when she reached day three of the 20mg taper. She called the clinic and Dr. Santoyo recommended alternating between prednisone 30mg and 25mg every day until she could be seen again in clinic.     The patient notes that less hair seems to be falling out although it's difficult to tell as she has less hair now. She also notes that she stopped nursing her daughter when she started the prednisone so is wondering if that has anything to do w/ the activity of her alopecia. Her sister's wedding is in August so she's really hoping she won't have to wear a wig. Abd pain and diarrhea w/ eating which patient was c/o at last visit have resolved " completely.    Past Medical History:   Patient Active Problem List   Diagnosis     Alopecia areata     Autoimmune disease (H)     KP (keratosis pilaris)     Dermatitis, seborrheic     Skin atrophy     No past medical history on file.  No past surgical history on file.    Social History:  Patient reports that she has never smoked. She has never used smokeless tobacco.    Family History:  Family History   Problem Relation Age of Onset     Cancer Paternal Grandmother         skin cancer     Skin Cancer Paternal Grandmother      Skin Cancer Maternal Grandmother        Medications:  Current Outpatient Medications   Medication Sig Dispense Refill     ALBUTEROL IN Inhale into the lungs as needed       clobetasol propionate (CLOBEX) 0.05 % external shampoo Script should read 2 to 3 times weekly 60 mL 1     Ondansetron HCl (ZOFRAN PO) Take by mouth as needed for nausea or vomiting       predniSONE (DELTASONE) 10 MG tablet Take 3 pills/day for 7 days, then 2/day for 7 days, then 1/day for 7 days, then 0.5/day for 7d. 46 tablet 0     predniSONE (DELTASONE) 5 MG tablet 6 pills daily for 5 days, then 4 pillls dailly for 5 days then 2 pill daily for 5 days then one  tablet for a week 60 tablet 0     Prenatal MV-Min-Fe Fum-FA-DHA (PRENATAL 1 PO) Take by mouth daily       Probiotic Product (PROBIOTIC-10 PO)        promethazine (PHENERGAN) 25 MG tablet Take by mouth every 6 hours as needed for nausea       ranitidine (ZANTAC) 150 MG capsule Take by mouth 2 times daily       Vitamin D, Cholecalciferol, 400 units CHEW        clobetasol propionate (OLUX) 0.05 % FOAM Apply a golf ball size of product to affected areas nightly 6 to 7 days of the week (one day off) (Patient not taking: Reported on 7/9/2018) 100 g 3     predniSONE (DELTASONE) 10 MG tablet Take 1 tablet (10 mg) by mouth every 48 hours (Patient not taking: Reported on 7/9/2018) 112 tablet 0     triamcinolone (KENALOG) 0.1 % ointment Apply to rash on the left ring finger  twice per day until resolution (Patient not taking: Reported on 7/9/2018) 30 g 1     Allergies   Allergen Reactions     Ceclor [Cefaclor]      hives     Erythromycin      hives     Relafen [Nabumetone]      hives     Rocephin [Ceftriaxone]      hives     Review of Systems:  -Constitutional: Otherwise feeling well today, in usual state of health.  -HEENT: Patient denies nonhealing oral sores.  -Skin: As above in HPI. No additional skin concerns.    Physical exam:  Vitals: /77 (BP Location: Right arm, Patient Position: Sitting, Cuff Size: Adult Regular)   Pulse 62   GEN: This is a well developed, well-nourished female in no acute distress, in a pleasant mood.    SKIN: Sun-exposed skin, which includes the head/face, neck, both arms, digits, and/or nails was examined.   - Negative hair pull tests on scalp  - Focal patches of non-scarring alopecia in the ophiasis region, parietal scalp, R greater than L and vertex regionn  - mild scale present  - negative hair pull tests  -No other lesions of concern on areas examined.     Impression/Plan:  1. Alopecia areata, not apparently active today    Continue slow prednisone taper: alternating 20mg and 30mg every day for seven days, followed by 15mg and 30mg every day for seven days, followed by 10mg and 30mg every day for seven days, followed by 5mg and 30mg every day for seven days, followed by 30mg every other day until seen in clinic on 9/9     Cont Clobex shampoo at least 2-3 times per week    Follow-up on 9/9    Patient seen and discussed with Dr. Santoyo.     Meredith Molina M.D.   PGY3 Internal Medicine   [phone]    Patient was seen and examined with the internal medicine resident. I agree with the history, review of systems, physical examination, assessments and plan.    Valery Santoyo MD  Professor and  Chair  Department of Dermatology  [facility]

## 2019-07-01 NOTE — LETTER
"7/1/2019       RE: Shea Siddiqui  [address] [facility] [address]     Dear Colleague,    Thank you for referring your patient, Shea Siddiqui, to the [facility] DERMATOLOGY at [facility]. Please see a copy of my visit note below.                                        Pictures were placed in Pt's chart today for future reference.      [facility] Dermatology Note      Dermatology Problem List:  1.Alopecia areata: does not appear to be active today, although patient reported increased activity when tapering prednisone- will start on a longer prednisone taper and f/u w/ patient on 9/9   -Has responded to ILK  in the past (pt is seen here and at [facility])               -ILK: 8/31/18 (at [place]), 10/15/18 (at [facility])   -Clobetasol shampoo 2-4x/week       2. History of lichen simplex chronicus vs hypertrophic scar - left achilles region      3. History of contact dermatitis, L ring finger: Suspect ACD 2/2 metal     4. Concern for possible LPP on the frontal scalp (first noticed on 10/15/18), not an issue today 7/1/19    CC:   Chief Complaint   Patient presents with     Hair Loss     Shea is here today to be seen for hair loss- Shea states \"it's increased\".      Encounter Date: Jul 1, 2019    History of Present Illness:  Ms. Shea Siddiqui is a 34 year old female who returns for follow-up primarily of her scalp alopecia areata. She was seen on 5/31 for this and was noted to have active disease w/ + pull test. She was started on a prednisone taper starting at 30mg every day for five days, followed by 20mg for five days. However, she noticed that her hair started falling out in clumps when she reached day three of the 20mg taper. She called the clinic and Dr. Santoyo recommended alternating between prednisone 30mg and 25mg every day until she could be seen again in clinic.     The patient notes that less hair seems to be falling out " although it's difficult to tell as she has less hair now. She also notes that she stopped nursing her daughter when she started the prednisone so is wondering if that has anything to do w/ the activity of her alopecia. Her sister's wedding is in August so she's really hoping she won't have to wear a wig. Abd pain and diarrhea w/ eating which patient was c/o at last visit have resolved completely.    Past Medical History:   Patient Active Problem List   Diagnosis     Alopecia areata     Autoimmune disease (H)     KP (keratosis pilaris)     Dermatitis, seborrheic     Skin atrophy     No past medical history on file.  No past surgical history on file.    Social History:  Patient reports that she has never smoked. She has never used smokeless tobacco.    Family History:  Family History   Problem Relation Age of Onset     Cancer Paternal Grandmother         skin cancer     Skin Cancer Paternal Grandmother      Skin Cancer Maternal Grandmother        Medications:  Current Outpatient Medications   Medication Sig Dispense Refill     ALBUTEROL IN Inhale into the lungs as needed       clobetasol propionate (CLOBEX) 0.05 % external shampoo Script should read 2 to 3 times weekly 60 mL 1     Ondansetron HCl (ZOFRAN PO) Take by mouth as needed for nausea or vomiting       predniSONE (DELTASONE) 10 MG tablet Take 3 pills/day for 7 days, then 2/day for 7 days, then 1/day for 7 days, then 0.5/day for 7d. 46 tablet 0     predniSONE (DELTASONE) 5 MG tablet 6 pills daily for 5 days, then 4 pillls dailly for 5 days then 2 pill daily for 5 days then one  tablet for a week 60 tablet 0     Prenatal MV-Min-Fe Fum-FA-DHA (PRENATAL 1 PO) Take by mouth daily       Probiotic Product (PROBIOTIC-10 PO)        promethazine (PHENERGAN) 25 MG tablet Take by mouth every 6 hours as needed for nausea       ranitidine (ZANTAC) 150 MG capsule Take by mouth 2 times daily       Vitamin D, Cholecalciferol, 400 units CHEW        clobetasol propionate (OLUX)  0.05 % FOAM Apply a golf ball size of product to affected areas nightly 6 to 7 days of the week (one day off) (Patient not taking: Reported on 7/9/2018) 100 g 3     predniSONE (DELTASONE) 10 MG tablet Take 1 tablet (10 mg) by mouth every 48 hours (Patient not taking: Reported on 7/9/2018) 112 tablet 0     triamcinolone (KENALOG) 0.1 % ointment Apply to rash on the left ring finger twice per day until resolution (Patient not taking: Reported on 7/9/2018) 30 g 1     Allergies   Allergen Reactions     Ceclor [Cefaclor]      hives     Erythromycin      hives     Relafen [Nabumetone]      hives     Rocephin [Ceftriaxone]      hives     Review of Systems:  -Constitutional: Otherwise feeling well today, in usual state of health.  -HEENT: Patient denies nonhealing oral sores.  -Skin: As above in HPI. No additional skin concerns.    Physical exam:  Vitals: /77 (BP Location: Right arm, Patient Position: Sitting, Cuff Size: Adult Regular)   Pulse 62   GEN: This is a well developed, well-nourished female in no acute distress, in a pleasant mood.    SKIN: Sun-exposed skin, which includes the head/face, neck, both arms, digits, and/or nails was examined.   - Negative hair pull tests on scalp  - Focal patches of non-scarring alopecia in the ophiasis region, parietal scalp, R greater than L and vertex regionn  - mild scale present  - negative hair pull tests  -No other lesions of concern on areas examined.     Impression/Plan:  1. Alopecia areata, not apparently active today    Continue slow prednisone taper: alternating 20mg and 30mg every day for seven days, followed by 15mg and 30mg every day for seven days, followed by 10mg and 30mg every day for seven days, followed by 5mg and 30mg every day for seven days, followed by 30mg every other day until seen in clinic on 9/9     Cont Clobex shampoo at least 2-3 times per week    Follow-up on 9/9    Patient seen and discussed with Dr. Santoyo.     Meredith Molina M.D.   PGY3  Internal Medicine   [phone]    Patient was seen and examined with the internal medicine resident. I agree with the history, review of systems, physical examination, assessments and plan.    Valery Santoyo MD  Professor and  Chair  Department of Dermatology  [facility]

## 2019-08-09 ENCOUNTER — OFFICE VISIT (OUTPATIENT)
Dept: DERMATOLOGY | Facility: CLINIC | Age: 34
End: 2019-08-09
Payer: COMMERCIAL

## 2019-08-09 VITALS — HEART RATE: 70 BPM | SYSTOLIC BLOOD PRESSURE: 116 MMHG | DIASTOLIC BLOOD PRESSURE: 72 MMHG

## 2019-08-09 DIAGNOSIS — L63.9 ALOPECIA AREATA: ICD-10-CM

## 2019-08-09 DIAGNOSIS — Z79.899 MEDICATION MANAGEMENT: Primary | ICD-10-CM

## 2019-08-09 RX ORDER — PREDNISONE 5 MG/1
TABLET ORAL
Qty: 100 TABLET | Refills: 0 | Status: SHIPPED | OUTPATIENT
Start: 2019-08-09 | End: 2019-11-26

## 2019-08-09 ASSESSMENT — PAIN SCALES - GENERAL: PAINLEVEL: NO PAIN (0)

## 2019-08-09 NOTE — PROGRESS NOTES
[facility] Dermatology Note      Dermatology Problem List:  1.Alopecia areata: does not appear to be active today, although patient reported increased activity when tapering prednisone- will continue to taper prednisone and f/u w/ patient in 1-2 months  -Has responded to ILK in the past ([facility]  1.      Alopecia areata, not clinically active today and with evidence of hair regrowth with follicular plugging    Continue slow prednisone taper: Wean from 30mg every other days by 5mg weekly. (30mg every other day x 7 days, 25 mg every other day x 7 days, 20mg every other day x 7 days, 15mg every other day x 7 days, 10mg every other day x 7 days, then continue 5mg every other day until seen in clinic.)     Cont Clobex shampoo at least 2-3 times per week alternating with Head & Shoulders     Will consider ILK at next visit        2. History of lichen simplex chronicus vs hypertrophic scar - left achilles region      3. History of contact dermatitis, L ring finger: Suspect ACD 2/2 metal     4. Concern for possible LPP on the frontal scalp (first noticed on 10/15/18), not an issue today 7/1/19    Encounter Date: Aug 9, 2019    CC:   Chief Complaint   Patient presents with     Derm Problem     Shea is here for a hairlos follow up, states she has new growth.          History of Present Illness:  Ms. Shea Siddiqui is a 34 year old female who returns for follow-up primarily of her scalp alopecia areata. She was seen on 7/1/19 for this and was not noted to be active disease at that time. She was on a prednisone taper.  The patient notices some new growth since last visit. Going to a wedding in [place] this weekend and has been using [employer] hair fiber powder to mask patches. There is some plugging of  hair follicles in the top of scalp along with some mild scalp hair thinning. Still using clobetasol shampoo as instructed with head and shoulder's intermittently.      Past Medical History:   Patient Active Problem List   Diagnosis     Alopecia areata     Autoimmune disease (H)     KP (keratosis pilaris)     Dermatitis, seborrheic     Skin atrophy     No past medical history on file.  No past surgical history on file.    Social History:  Patient reports that she has never smoked. She has never used smokeless tobacco.    Family History:  Family History   Problem Relation Age of Onset     Cancer Paternal Grandmother         skin cancer     Skin Cancer Paternal Grandmother      Skin Cancer Maternal Grandmother        Medications:  Current Outpatient Medications   Medication Sig Dispense Refill     ALBUTEROL IN Inhale into the lungs as needed       clobetasol propionate (CLOBEX) 0.05 % external shampoo Script should read 2 to 3 times weekly 60 mL 1     Ondansetron HCl (ZOFRAN PO) Take by mouth as needed for nausea or vomiting       predniSONE (DELTASONE) 10 MG tablet Take 3 pills/day for 7 days, then 2/day for 7 days, then 1/day for 7 days, then 0.5/day for 7d. 46 tablet 0     predniSONE (DELTASONE) 5 MG tablet 6 pills daily for 5 days, then 4 pillls dailly for 5 days then 2 pill daily for 5 days then one  tablet for a week 60 tablet 0     predniSONE 10 MG (48) TBPK Take 30 mg by mouth daily 114 each 0     Prenatal MV-Min-Fe Fum-FA-DHA (PRENATAL 1 PO) Take by mouth daily       Probiotic Product (PROBIOTIC-10 PO)        promethazine (PHENERGAN) 25 MG tablet Take by mouth every 6 hours as needed for nausea       Vitamin D, Cholecalciferol, 400 units CHEW        clobetasol propionate (OLUX) 0.05 % FOAM Apply a golf ball size of product to affected areas nightly 6 to 7 days of the week (one day off) (Patient not taking: Reported on 7/9/2018) 100 g 3     predniSONE (DELTASONE) 10 MG tablet Take 1 tablet (10 mg) by mouth every 48 hours (Patient not taking: Reported on 7/9/2018) 112 tablet 0     ranitidine (ZANTAC) 150 MG capsule Take by mouth 2 times daily       triamcinolone (KENALOG)  0.1 % ointment Apply to rash on the left ring finger twice per day until resolution (Patient not taking: Reported on 7/9/2018) 30 g 1        Allergies   Allergen Reactions     Ceclor [Cefaclor]      hives     Erythromycin      hives     Relafen [Nabumetone]      hives     Rocephin [Ceftriaxone]      hives     Review of Systems:  -Not pertinent to the current visit.  -Constitutional: Otherwise feeling well today, in usual state of health.  -HEENT: Patient denies nonhealing oral sores.  -Skin: As above in HPI. No additional skin concerns.    Physical exam:  Vitals: /72 (BP Location: Left arm, Patient Position: Chair, Cuff Size: Adult Large)   Pulse 70   GEN: This is a well developed, well-nourished female in no acute distress, in a pleasant mood.    SKIN: Sun-exposed skin, which includes the head/face.   - Negative hair pull tests on scalp  - Focal patches of non-scarring alopecia in the ophiasis region, parietal scalp, R greater than L and vertex regionn  - mild scale present  - negative hair pull tests  -No other lesions of concern on areas examined.     Impression/Plan:  2. Alopecia areata, not clinically active today and with evidence of hair regrowth with follicular plugging    Continue slow prednisone taper: Wean from 30mg every other days by 5mg weekly. (30mg every other day x 7 days, 25 mg every other day x 7 days, 20mg every other day x 7 days, 15mg every other day x 7 days, 10mg every other day x 7 days, then continue 5mg every other day until seen in clinic.)     Cont Clobex shampoo at least 2-3 times per week alternating with Head & Shoulders     Will consider ILK at next visit    Follow-up in 1 months, earlier for new or changing lesions.       Dr. Santoyo staffed the patient.    Staff Involved:  Resident(Jairo Lucero)    Patient was seen and examined with the plastic surgery resident. I agree with the history, review of systems, physical examination, assessments and plan.    Valery Santoyo,  MD  Professor and  Chair  Department of Dermatology  [facility]

## 2019-08-09 NOTE — NURSING NOTE
Dermatology Rooming Note    Shea Siddiqui's goals for this visit include:   Chief Complaint   Patient presents with     Derm Problem     Shea is here for a hairlos follow up, states she has new growth.        Danielle Schwartz LPN

## 2019-08-09 NOTE — LETTER
8/9/2019       RE: Shea Siddiqui  [address] [facility] [address]     Dear Colleague,    Thank you for referring your patient, Shea Siddiqui, to the [facility] DERMATOLOGY at [facility]. Please see a copy of my visit note below.    [facility] Dermatology Note      Dermatology Problem List:  1.Alopecia areata: does not appear to be active today, although patient reported increased activity when tapering prednisone- will continue to taper prednisone and f/u w/ patient in 1-2 months  -Has responded to ILK in the past (pt is seen here and at [facility])  1.      Alopecia areata, not  clinically active today and with evidence of hair regrowth with follicular plugging    Continue slow prednisone taper: Wean from 30mg every other days by 5mg weekly. (30mg every other day x 7 days, 25 mg every other day x 7 days, 20mg every other day x 7 days, 15mg every other day x 7 days, 10mg every other day x 7 days, then continue 5mg every other day until seen in clinic.)     Cont Clobex shampoo at least 2-3 times per week alternating with Head & Shoulders     Will consider ILK at next visit        2. History of lichen simplex chronicus vs hypertrophic scar - left achilles region      3. History of contact dermatitis, L ring finger: Suspect ACD 2/2 metal     4. Concern for possible LPP on the frontal scalp (first noticed on 10/15/18), not an issue today 7/1/19    Encounter Date: Aug 9, 2019    CC:   Chief Complaint   Patient presents with     Derm Problem     Shea is here for a hairlos follow up, states she has new growth.          History of Present Illness:  Ms. Shea Siddiqui is a 34 year old female who returns for follow-up primarily of her scalp alopecia areata. She was seen on 7/1/19 for this and was not noted to be active disease at that time. She was on a prednisone taper.  The patient notices some new growth since last visit. Going to a wedding  in [place] this weekend and has been using [employer] hair fiber powder to mask patches. There is some plugging of  hair follicles in the top of scalp along with some mild scalp hair thinning. Still using clobetasol shampoo as instructed with head and shoulder's intermittently.     Past Medical History:   Patient Active Problem List   Diagnosis     Alopecia areata     Autoimmune disease (H)     KP (keratosis pilaris)     Dermatitis, seborrheic     Skin atrophy     No past medical history on file.  No past surgical history on file.    Social History:  Patient reports that she has never smoked. She has never used smokeless tobacco.    Family History:  Family History   Problem Relation Age of Onset     Cancer Paternal Grandmother         skin cancer     Skin Cancer Paternal Grandmother      Skin Cancer Maternal Grandmother        Medications:  Current Outpatient Medications   Medication Sig Dispense Refill     ALBUTEROL IN Inhale into the lungs as needed       clobetasol propionate (CLOBEX) 0.05 % external shampoo Script should read 2 to 3 times weekly 60 mL 1     Ondansetron HCl (ZOFRAN PO) Take by mouth as needed for nausea or vomiting       predniSONE (DELTASONE) 10 MG tablet Take 3 pills/day for 7 days, then 2/day for 7 days, then 1/day for 7 days, then 0.5/day for 7d. 46 tablet 0     predniSONE (DELTASONE) 5 MG tablet 6 pills daily for 5 days, then 4 pillls dailly for 5 days then 2 pill daily for 5 days then one  tablet for a week 60 tablet 0     predniSONE 10 MG (48) TBPK Take 30 mg by mouth daily 114 each 0     Prenatal MV-Min-Fe Fum-FA-DHA (PRENATAL 1 PO) Take by mouth daily       Probiotic Product (PROBIOTIC-10 PO)        promethazine (PHENERGAN) 25 MG tablet Take by mouth every 6 hours as needed for nausea       Vitamin D, Cholecalciferol, 400 units CHEW        clobetasol propionate (OLUX) 0.05 % FOAM Apply a golf ball size of product to affected areas nightly 6 to 7 days of the week (one day off) (Patient not  taking: Reported on 7/9/2018) 100 g 3     predniSONE (DELTASONE) 10 MG tablet Take 1 tablet (10 mg) by mouth every 48 hours (Patient not taking: Reported on 7/9/2018) 112 tablet 0     ranitidine (ZANTAC) 150 MG capsule Take by mouth 2 times daily       triamcinolone (KENALOG) 0.1 % ointment Apply to rash on the left ring finger twice per day until resolution (Patient not taking: Reported on 7/9/2018) 30 g 1        Allergies   Allergen Reactions     Ceclor [Cefaclor]      hives     Erythromycin      hives     Relafen [Nabumetone]      hives     Rocephin [Ceftriaxone]      hives     Review of Systems:  -Not pertinent to the current visit.  -Constitutional: Otherwise feeling well today, in usual state of health.  -HEENT: Patient denies nonhealing oral sores.  -Skin: As above in HPI. No additional skin concerns.    Physical exam:  Vitals: /72 (BP Location: Left arm, Patient Position: Chair, Cuff Size: Adult Large)   Pulse 70   GEN: This is a well developed, well-nourished female in no acute distress, in a pleasant mood.    SKIN: Sun-exposed skin, which includes the head/face.   - Negative hair pull tests on scalp  - Focal patches of non-scarring alopecia in the ophiasis region, parietal scalp, R greater than L and vertex regionn  - mild scale present  - negative hair pull tests  -No other lesions of concern on areas examined.     Impression/Plan:  2. Alopecia areata, not  clinically active today and with evidence of hair regrowth with follicular plugging    Continue slow prednisone taper: Wean from 30mg every other days by 5mg weekly. (30mg every other day x 7 days, 25 mg every other day x 7 days, 20mg every other day x 7 days, 15mg every other day x 7 days, 10mg every other day x 7 days, then continue 5mg every other day until seen in clinic.)     Cont Clobex shampoo at least 2-3 times per week alternating with Head & Shoulders     Will consider ILK at next visit    Follow-up in 1 months, earlier for new or  changing lesions.       Dr. Santoyo staffed the patient.    Staff Involved:  Resident(Jairo Lucero)    Patient was seen and examined with the plastic surgery resident. I agree with the history, review of systems, physical examination, assessments and plan.    Valery Santoyo MD  Professor and  Chair  Department of Dermatology  [facility]                                        Pictures were placed in Pt's chart today for future reference.      Again, thank you for allowing me to participate in the care of your patient.      Sincerely,    Valery Santoyo MD

## 2019-09-10 ENCOUNTER — OFFICE VISIT (OUTPATIENT)
Dept: DERMATOLOGY | Facility: CLINIC | Age: 34
End: 2019-09-10
Payer: COMMERCIAL

## 2019-09-10 VITALS — SYSTOLIC BLOOD PRESSURE: 129 MMHG | DIASTOLIC BLOOD PRESSURE: 69 MMHG | HEART RATE: 65 BPM

## 2019-09-10 DIAGNOSIS — M35.9 AUTOIMMUNE DISEASE (H): ICD-10-CM

## 2019-09-10 DIAGNOSIS — L90.9 SKIN ATROPHY: ICD-10-CM

## 2019-09-10 DIAGNOSIS — L63.9 ALOPECIA AREATA: Primary | ICD-10-CM

## 2019-09-10 ASSESSMENT — PAIN SCALES - GENERAL: PAINLEVEL: NO PAIN (0)

## 2019-09-10 NOTE — PROGRESS NOTES
[facility] Dermatology Note      Dermatology Problem List:  1.Alopecia areata: not clinically active with evidence of new hair growth  - Current treatment: ILK on 09/10/2019, and Clobex shampoo 0.05% 2-3x weekly  - Has responded to ILK in the past (pt is seen here and at [facility])  2. 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 History of contact dermatitis, L ring finger: Suspect ACD 2/2 metal  4. Concern for possible LPP on the frontal scalp (first noticed on 10/15/18), not an issue today 9/10/2019    Encounter Date: Sep 10, 2019    CC:  Chief Complaint   Patient presents with     Hair Loss     Shea is here today for a hair loss follow up.          History of Present Illness:  Ms. Shea Siddiqui is a 34 year old female who presents as a follow-up for alopecia areata. The patient was last seen on 08/09/2019 when she was continued on her prednisone wean and Clobex shampoo 2-3x a week. ILK was discussed but due to skin atrophy was deferred at that time.    Today Shea is doing well and believes her condition is stable. She continues to have some hair shedding but believes this is a normal amount. She has not seen hair growth at her frontal hair line but cannot see most of areas of hair loss to assess for new growth. Her current regimen consists of the prednisone taper, she is currently on 15 mg every other day. She is also using the Clobex shampoo 2x a week and alternating it with the [place] Tea Tree shampoo. She denies any scalp irritation, redness or itching. She has no other concerns.      Past Medical History:   Patient Active Problem List   Diagnosis     Alopecia areata     Autoimmune disease (H)     KP (keratosis pilaris)     Dermatitis, seborrheic     Skin atrophy     No past medical history on file.  No past surgical history on file.    Social History:  Patient reports that she has never smoked. She has never used smokeless  tobacco.    Family History:  Family History   Problem Relation Age of Onset     Cancer Paternal Grandmother         skin cancer     Skin Cancer Paternal Grandmother      Skin Cancer Maternal Grandmother        Medications:  Current Outpatient Medications   Medication Sig Dispense Refill     ALBUTEROL IN Inhale into the lungs as needed       clobetasol propionate (CLOBEX) 0.05 % external shampoo Script should read 2 to 3 times weekly 60 mL 1     Ondansetron HCl (ZOFRAN PO) Take by mouth as needed for nausea or vomiting       predniSONE (DELTASONE) 10 MG tablet Take 3 pills/day for 7 days, then 2/day for 7 days, then 1/day for 7 days, then 0.5/day for 7d. 46 tablet 0     predniSONE (DELTASONE) 5 MG tablet 5 pills every other day for 7 days, then 4 pillls every other day for 7 days then 3 pills every other day for 7 days then 2 pills every other day for 7 days, then one pill every other day until seen in clinic. 100 tablet 0     Prenatal MV-Min-Fe Fum-FA-DHA (PRENATAL 1 PO) Take by mouth daily       Probiotic Product (PROBIOTIC-10 PO)        promethazine (PHENERGAN) 25 MG tablet Take by mouth every 6 hours as needed for nausea       ranitidine (ZANTAC) 150 MG capsule Take by mouth 2 times daily       Vitamin D, Cholecalciferol, 400 units CHEW        clobetasol propionate (OLUX) 0.05 % FOAM Apply a golf ball size of product to affected areas nightly 6 to 7 days of the week (one day off) (Patient not taking: Reported on 7/9/2018) 100 g 3     predniSONE (DELTASONE) 10 MG tablet Take 1 tablet (10 mg) by mouth every 48 hours (Patient not taking: Reported on 7/9/2018) 112 tablet 0     triamcinolone (KENALOG) 0.1 % ointment Apply to rash on the left ring finger twice per day until resolution (Patient not taking: Reported on 7/9/2018) 30 g 1     Allergies   Allergen Reactions     Ceclor [Cefaclor]      hives     Erythromycin      hives     Relafen [Nabumetone]      hives     Rocephin [Ceftriaxone]      hives         Review of  Systems:  -Skin Establ Pt: The patient denies any new rash, pruritus, or lesions that are symptomatic, changing or bleeding, except as per HPI.  -Constitutional: Otherwise feeling well today, in usual state of health.  -HEENT: Patient denies nonhealing oral sores.  -Skin: As above in HPI. No additional skin concerns.    Physical exam:  Vitals: /69   Pulse 65   GEN: This is a well developed, well-nourished female in no acute distress, in a pleasant mood.    SKIN: Focused examination of the face and scalp was performed.  - Focal patches of non-scarring alopecia:   - Right-sided patch extending from the frontal scalp to the temporal, parietal and parietally onto the right occipital scalp. There is thicker hair and new growth along the patch when compared to the pictures from the last visit. There are small patches of skin atrophy.    - Left-sided patch extending from the frontal scalp to the temporal scalp. There is thicker hair and new growth along the patch when compared to the pictures from the last visit.   - Crown of the head patch appears smaller when compared to previous pictures with thicker hair along the edge.   - Frontal hairline with new tapered growth.  - No scalp erythema or scaling  - Negative hair pull test  - No other lesions of concern on areas examined.     Impression/Plan:    1.  Alopecia areata - not clinically active with evidence of continued hair growth and new hair growth    Kenalog intralesional injection procedure note (performed by faculty): After verbal consent and discussion of risks including but not limited to atrophy, pain, and bruising,  time out was performed, 2 total cc of Kenalog 10 mg/cc was injected into 20 sites on the scalp.  The patient tolerated the procedure well and left the Dermatology clinic in good condition.    Continue slow prednisone taper. Currently on 15 mg every other day. Will continue to wean by 5 mg every 7 days until off.    Continue Clobex 0.05% shampoo  2-3x weekly    Follow-up in 2 months, earlier for new or changing lesions.     Staff Involved:  I, Emily Valencia, MS4, saw and examined the patient in the presence of Dr. Santoyo.    Staff Physician:  I was present with the medical student who participated in the service and in the documentation of the note. I have verified the history and personally performed the physical exam and medical decision making. I agree with the assessment and plan of care as documented in the note.  ILK injections were primarily done by me.    Valery Santoyo MD  Professor and Chair  Department of Dermatology  [facility]: Phone: [phone], Fax:[phone]  [facility]: Phone: [phone], Fax: [phone]

## 2019-09-10 NOTE — NURSING NOTE
Dermatology Rooming Note    Shea Siddiqui's goals for this visit include:   Chief Complaint   Patient presents with     Hair Loss     Shea is here today for a hair loss follow up.      JER Rivero

## 2019-09-10 NOTE — LETTER
9/10/2019       RE: Shea Siddiqui  [address] [place] [address]     Dear Colleague,    Thank you for referring your patient, Shea Siddiqui, to the [facility] DERMATOLOGY at [facility]. Please see a copy of my visit note below.    [facility] Dermatology Note      Dermatology Problem List:  1.Alopecia areata: not clinically active with evidence of new hair growth  - Current treatment: ILK on 09/10/2019, and Clobex shampoo 0.05% 2-3x weekly  - Has responded to ILK in the past (pt is seen here and at [facility])  2. 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 History of contact dermatitis, L ring finger: Suspect ACD 2/2 metal  4. Concern for possible LPP on the frontal scalp (first noticed on 10/15/18), not an issue today 9/10/2019    Encounter Date: Sep 10, 2019    CC:  Chief Complaint   Patient presents with     Hair Loss     Shea is here today for a hair loss follow up.          History of Present Illness:  Ms. Shea Siddiqui is a 34 year old female who presents as a follow-up for alopecia areata. The patient was last seen on 08/09/2019 when she was continued on her prednisone wean and Clobex shampoo 2-3x a week. ILK was discussed but due to skin atrophy was deferred at that time.    Today Shea is doing well and believes her condition is stable. She continues to have some hair shedding but believes this is a normal amount. She has not seen hair growth at her frontal hair line but cannot see most of areas of hair loss to assess for new growth. Her current regimen consists of the prednisone taper, she is currently on 15 mg every other day. She is also using the Clobex shampoo 2x a week and alternating it with the [place] Tea Tree shampoo. She denies any scalp irritation, redness or itching. She has no other concerns.      Past Medical History:   Patient Active Problem List   Diagnosis      Alopecia areata     Autoimmune disease (H)     KP (keratosis pilaris)     Dermatitis, seborrheic     Skin atrophy     No past medical history on file.  No past surgical history on file.    Social History:  Patient reports that she has never smoked. She has never used smokeless tobacco.    Family History:  Family History   Problem Relation Age of Onset     Cancer Paternal Grandmother         skin cancer     Skin Cancer Paternal Grandmother      Skin Cancer Maternal Grandmother        Medications:  Current Outpatient Medications   Medication Sig Dispense Refill     ALBUTEROL IN Inhale into the lungs as needed       clobetasol propionate (CLOBEX) 0.05 % external shampoo Script should read 2 to 3 times weekly 60 mL 1     Ondansetron HCl (ZOFRAN PO) Take by mouth as needed for nausea or vomiting       predniSONE (DELTASONE) 10 MG tablet Take 3 pills/day for 7 days, then 2/day for 7 days, then 1/day for 7 days, then 0.5/day for 7d. 46 tablet 0     predniSONE (DELTASONE) 5 MG tablet 5 pills every other day for 7 days, then 4 pillls every other day for 7 days then 3 pills every other day for 7 days then 2 pills every other day for 7 days, then one pill every other day until seen in clinic. 100 tablet 0     Prenatal MV-Min-Fe Fum-FA-DHA (PRENATAL 1 PO) Take by mouth daily       Probiotic Product (PROBIOTIC-10 PO)        promethazine (PHENERGAN) 25 MG tablet Take by mouth every 6 hours as needed for nausea       ranitidine (ZANTAC) 150 MG capsule Take by mouth 2 times daily       Vitamin D, Cholecalciferol, 400 units CHEW        clobetasol propionate (OLUX) 0.05 % FOAM Apply a golf ball size of product to affected areas nightly 6 to 7 days of the week (one day off) (Patient not taking: Reported on 7/9/2018) 100 g 3     predniSONE (DELTASONE) 10 MG tablet Take 1 tablet (10 mg) by mouth every 48 hours (Patient not taking: Reported on 7/9/2018) 112 tablet 0     triamcinolone (KENALOG) 0.1 % ointment Apply to rash on the left  ring finger twice per day until resolution (Patient not taking: Reported on 7/9/2018) 30 g 1     Allergies   Allergen Reactions     Ceclor [Cefaclor]      hives     Erythromycin      hives     Relafen [Nabumetone]      hives     Rocephin [Ceftriaxone]      hives         Review of Systems:  -Skin Establ Pt: The patient denies any new rash, pruritus, or lesions that are symptomatic, changing or bleeding, except as per HPI.  -Constitutional: Otherwise feeling well today, in usual state of health.  -HEENT: Patient denies nonhealing oral sores.  -Skin: As above in HPI. No additional skin concerns.    Physical exam:  Vitals: /69   Pulse 65   GEN: This is a well developed, well-nourished female in no acute distress, in a pleasant mood.    SKIN: Focused examination of the face and scalp was performed.  - Focal patches of non-scarring alopecia:   - Right-sided patch extending from the frontal scalp to the temporal, parietal and parietally onto the right occipital scalp. There is thicker hair and new growth along the patch when compared to the pictures from the last visit. There are small patches of skin atrophy.    - Left-sided patch extending from the frontal scalp to the temporal scalp. There is thicker hair and new growth along the patch when compared to the pictures from the last visit.   - Crown of the head patch appears smaller when compared to previous pictures with thicker hair along the edge.   - Frontal hairline with new tapered growth.  - No scalp erythema or scaling  - Negative hair pull test  - No other lesions of concern on areas examined.     Impression/Plan:    1.  Alopecia areata - not clinically active with evidence of continued hair growth and new hair growth    Kenalog intralesional injection procedure note (performed by faculty): After verbal consent and discussion of risks including but not limited to atrophy, pain, and bruising,  time out was performed, 2 total cc of Kenalog 10 mg/cc was  injected into 20 sites on the scalp.  The patient tolerated the procedure well and left the Dermatology clinic in good condition.    Continue slow prednisone taper. Currently on 15 mg every other day. Will continue to wean by 5 mg every 7 days until off.    Continue Clobex 0.05% shampoo 2-3x weekly    Follow-up in 2 months, earlier for new or changing lesions.     Staff Involved:  I, Emily Valencia, MS4, saw and examined the patient in the presence of Dr. Santoyo.    Staff Physician:  I was present with the medical student who participated in the service and in the documentation of the note. I have verified the history and personally performed the physical exam and medical decision making. I agree with the assessment and plan of care as documented in the note.  ILK injections were primarily done by me.    Valery Santoyo MD  Professor and Chair  Department of Dermatology  [facility]: Phone: [phone], Fax:[phone]  [facility]: Phone: [phone], Fax: [phone]                                                 Again, thank you for allowing me to participate in the care of your patient.      Sincerely,    Valery Santoyo MD

## 2019-11-09 ENCOUNTER — OFFICE VISIT (OUTPATIENT)
Dept: DERMATOLOGY | Facility: CLINIC | Age: 34
End: 2019-11-09
Payer: COMMERCIAL

## 2019-11-09 VITALS — SYSTOLIC BLOOD PRESSURE: 113 MMHG | HEART RATE: 57 BPM | DIASTOLIC BLOOD PRESSURE: 76 MMHG

## 2019-11-09 DIAGNOSIS — L63.9 ALOPECIA AREATA: Primary | ICD-10-CM

## 2019-11-09 DIAGNOSIS — L21.9 DERMATITIS, SEBORRHEIC: ICD-10-CM

## 2019-11-09 DIAGNOSIS — L90.9 SKIN ATROPHY: ICD-10-CM

## 2019-11-09 RX ORDER — FLUOCINOLONE ACETONIDE 0.11 MG/ML
OIL TOPICAL
Qty: 60 ML | Refills: 5 | Status: SHIPPED | OUTPATIENT
Start: 2019-11-09

## 2019-11-09 ASSESSMENT — PAIN SCALES - GENERAL: PAINLEVEL: NO PAIN (0)

## 2019-11-09 NOTE — LETTER
11/9/2019     RE: Shea Siddiqui  [address] [facility] [address]     Dear Colleague,    Thank you for referring your patient, Shea Siddiqui, to the [facility] DERMATOLOGY at [facility]. Please see a copy of my visit note below.    [facility] Dermatology Note      Dermatology Problem List:  1.Alopecia areata: not clinically active with evidence of new hair growth  - Current treatment: ILK on 11/09/19, and Clobex shampoo 0.05% 3-4x weekly, derma-smoothe/fs oil 1x weekly  - Has responded to ILK in the past (pt is seen here and at [facility])  2. 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 History of contact dermatitis, L ring finger: Suspect ACD 2/2 metal  4. Concern for possible LPP on the frontal scalp (first noticed on 10/15/18), not an issue today    Encounter Date: Nov 9, 2019    CC:  Chief Complaint   Patient presents with     Derm Problem     Shea is here for a hairloss follow up.           History of Present Illness:  Ms. Shea Siddiqui is a 34 year old female who presents as a follow-up for alopecia areata. The patient was last seen on 09/10/2019 when she had 2 ml ILK 10 injected and was continued on her prednisone taper and clobex 0.05% shampoo. Today she reports she has been doing well since her last visit, but has noticed some more hair coming out in the shower. She denies any scalp pain, itching, burning, or tingling.     She has lost 20 pounds in the past 8 weeks after cutting sugar and beginning to walk daily. Currently, she is shampooing daily and using Clobex 0.05% shampoo 3-4 days weekly, with [place] oil shampoo on the other days. She is interested in further injections today.    She sees a Dr. Nahed Julio at [facility] for ILK treatments between her visits to our clinic.    No other concerns.    Past Medical History:   Patient Active Problem List   Diagnosis     Alopecia  areata     Autoimmune disease (H)     KP (keratosis pilaris)     Dermatitis, seborrheic     Skin atrophy     No past medical history on file.  No past surgical history on file.    Social History:  Patient reports that she has never smoked. She has never used smokeless tobacco.    Family History:  Family History   Problem Relation Age of Onset     Cancer Paternal Grandmother         skin cancer     Skin Cancer Paternal Grandmother      Skin Cancer Maternal Grandmother        Medications:  Current Outpatient Medications   Medication Sig Dispense Refill     ALBUTEROL IN Inhale into the lungs as needed       clobetasol propionate (CLOBEX) 0.05 % external shampoo Script should read 2 to 3 times weekly 60 mL 1     Ondansetron HCl (ZOFRAN PO) Take by mouth as needed for nausea or vomiting       predniSONE (DELTASONE) 10 MG tablet Take 3 pills/day for 7 days, then 2/day for 7 days, then 1/day for 7 days, then 0.5/day for 7d. 46 tablet 0     predniSONE (DELTASONE) 5 MG tablet 5 pills every other day for 7 days, then 4 pillls every other day for 7 days then 3 pills every other day for 7 days then 2 pills every other day for 7 days, then one pill every other day until seen in clinic. 100 tablet 0     Prenatal MV-Min-Fe Fum-FA-DHA (PRENATAL 1 PO) Take by mouth daily       Probiotic Product (PROBIOTIC-10 PO)        promethazine (PHENERGAN) 25 MG tablet Take by mouth every 6 hours as needed for nausea       ranitidine (ZANTAC) 150 MG capsule Take by mouth 2 times daily       Vitamin D, Cholecalciferol, 400 units CHEW        clobetasol propionate (OLUX) 0.05 % FOAM Apply a golf ball size of product to affected areas nightly 6 to 7 days of the week (one day off) (Patient not taking: Reported on 7/9/2018) 100 g 3     predniSONE (DELTASONE) 10 MG tablet Take 1 tablet (10 mg) by mouth every 48 hours (Patient not taking: Reported on 7/9/2018) 112 tablet 0     triamcinolone (KENALOG) 0.1 % ointment Apply to rash on the left ring finger  twice per day until resolution (Patient not taking: Reported on 7/9/2018) 30 g 1     Allergies   Allergen Reactions     Ceclor [Cefaclor]      hives     Erythromycin      hives     Relafen [Nabumetone]      hives     Rocephin [Ceftriaxone]      hives         Review of Systems:  -Skin Establ Pt: The patient denies any new rash, pruritus, or lesions that are symptomatic, changing or bleeding, except as per HPI.  -Constitutional: Otherwise feeling well today, in usual state of health.  -HEENT: Patient denies nonhealing oral sores.  -Skin: As above in HPI. No additional skin concerns.    Physical exam:  Vitals: /76 (BP Location: Right arm, Patient Position: Chair, Cuff Size: Adult Large)   Pulse 57   GEN: This is a well developed, well-nourished female in no acute distress, in a pleasant mood.    SKIN: Focused examination of the face, scalp, and hands/nails was performed.  - perifollicular accentuation  - small patch of skin atrophy on bilateral temples  - fine vellus fibers in patches of alopecia on bilateral parietal scalp, mid-occipital scalp, and behind right ear  - eyebrows and eyelashes of normal density  - nails without abnormality  - negative hair pull tests  - No other lesions of concern on areas examined.     Impression/Plan:    1.  Alopecia areata - continued regrowth throughout scalp, small areas of skin atrophy on bilateral temples.    Kenalog intralesional injection procedure note (performed by faculty): After verbal consent and discussion of risks including but not limited to atrophy, pain, and bruising,  time out was performed, 2 total cc of Kenalog 10 mg/cc was injected into 20 sites on the scalp.  The patient tolerated the procedure well and left the Dermatology clinic in good condition.    Continue Clobex 0.05% shampoo 3-4x weekly    Start Derma-Smoothe/FS (fluocinolone acetonide) oil application. Apply 2 cc to entire scalp 1x weekly for at least 4 hours at a time to overnight  application.    Follow-up in 2 months, earlier for new or changing lesions.    Staff Involved:  Scribe/Staff    Scribe Disclosure  I, Marci Gale, am serving as a scribe to document services personally performed by Dr. Valery Santoyo MD, based on data collection and the provider's statements to me.    Provider Disclosure:   I agree with above History, Review of Systems, Physical exam and Plan. I have reviewed the content of the documentation and have edited it as needed. I have personally performed the services documented here and the documentation accurately represents those services and the decisions I have made.  ILK injections were done by me.    Valery Santoyo MD  Professor and Chair  Department of Dermatology  [facility]        Nahed Ruiz D.O.    [address] [place] [id number] [phone] (Fax)  on close of encounter                                            Pictures were placed in Pt's chart today for future reference.    Valery Santoyo MD

## 2019-11-09 NOTE — PROGRESS NOTES
[facility] Dermatology Note      Dermatology Problem List:  1.Alopecia areata: not clinically active with evidence of new hair growth  - Current treatment: ILK on 11/09/19, and Clobex shampoo 0.05% 3-4x weekly, derma-smoothe/fs oil 1x weekly  - Has responded to ILK in the past (pt is seen here and at [facility])  2. 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 History of contact dermatitis, L ring finger: Suspect ACD 2/2 metal  4. Concern for possible LPP on the frontal scalp (first noticed on 10/15/18), not an issue today    Encounter Date: Nov 9, 2019    CC:  Chief Complaint   Patient presents with     Derm Problem     Shea is here for a hairloss follow up.           History of Present Illness:  Ms. Shea Siddiqui is a 34 year old female who presents as a follow-up for alopecia areata. The patient was last seen on 09/10/2019 when she had 2 ml ILK 10 injected and was continued on her prednisone taper and clobex 0.05% shampoo. Today she reports she has been doing well since her last visit, but has noticed some more hair coming out in the shower. She denies any scalp pain, itching, burning, or tingling.     She has lost 20 pounds in the past 8 weeks after cutting sugar and beginning to walk daily. Currently, she is shampooing daily and using Clobex 0.05% shampoo 3-4 days weekly, with [place] oil shampoo on the other days. She is interested in further injections today.    She sees a Dr. Nahed Julio at [facility] for ILK treatments between her visits to our clinic.    No other concerns.    Past Medical History:   Patient Active Problem List   Diagnosis     Alopecia areata     Autoimmune disease (H)     KP (keratosis pilaris)     Dermatitis, seborrheic     Skin atrophy     No past medical history on file.  No past surgical history on file.    Social History:  Patient reports that she has never smoked. She has never used smokeless  tobacco.    Family History:  Family History   Problem Relation Age of Onset     Cancer Paternal Grandmother         skin cancer     Skin Cancer Paternal Grandmother      Skin Cancer Maternal Grandmother        Medications:  Current Outpatient Medications   Medication Sig Dispense Refill     ALBUTEROL IN Inhale into the lungs as needed       clobetasol propionate (CLOBEX) 0.05 % external shampoo Script should read 2 to 3 times weekly 60 mL 1     Ondansetron HCl (ZOFRAN PO) Take by mouth as needed for nausea or vomiting       predniSONE (DELTASONE) 10 MG tablet Take 3 pills/day for 7 days, then 2/day for 7 days, then 1/day for 7 days, then 0.5/day for 7d. 46 tablet 0     predniSONE (DELTASONE) 5 MG tablet 5 pills every other day for 7 days, then 4 pillls every other day for 7 days then 3 pills every other day for 7 days then 2 pills every other day for 7 days, then one pill every other day until seen in clinic. 100 tablet 0     Prenatal MV-Min-Fe Fum-FA-DHA (PRENATAL 1 PO) Take by mouth daily       Probiotic Product (PROBIOTIC-10 PO)        promethazine (PHENERGAN) 25 MG tablet Take by mouth every 6 hours as needed for nausea       ranitidine (ZANTAC) 150 MG capsule Take by mouth 2 times daily       Vitamin D, Cholecalciferol, 400 units CHEW        clobetasol propionate (OLUX) 0.05 % FOAM Apply a golf ball size of product to affected areas nightly 6 to 7 days of the week (one day off) (Patient not taking: Reported on 7/9/2018) 100 g 3     predniSONE (DELTASONE) 10 MG tablet Take 1 tablet (10 mg) by mouth every 48 hours (Patient not taking: Reported on 7/9/2018) 112 tablet 0     triamcinolone (KENALOG) 0.1 % ointment Apply to rash on the left ring finger twice per day until resolution (Patient not taking: Reported on 7/9/2018) 30 g 1     Allergies   Allergen Reactions     Ceclor [Cefaclor]      hives     Erythromycin      hives     Relafen [Nabumetone]      hives     Rocephin [Ceftriaxone]      hives         Review of  Systems:  -Skin Establ Pt: The patient denies any new rash, pruritus, or lesions that are symptomatic, changing or bleeding, except as per HPI.  -Constitutional: Otherwise feeling well today, in usual state of health.  -HEENT: Patient denies nonhealing oral sores.  -Skin: As above in HPI. No additional skin concerns.    Physical exam:  Vitals: /76 (BP Location: Right arm, Patient Position: Chair, Cuff Size: Adult Large)   Pulse 57   GEN: This is a well developed, well-nourished female in no acute distress, in a pleasant mood.    SKIN: Focused examination of the face, scalp, and hands/nails was performed.  - perifollicular accentuation  - small patch of skin atrophy on bilateral temples  - fine vellus fibers in patches of alopecia on bilateral parietal scalp, mid-occipital scalp, and behind right ear  - eyebrows and eyelashes of normal density  - nails without abnormality  - negative hair pull tests  - No other lesions of concern on areas examined.     Impression/Plan:    1.  Alopecia areata - continued regrowth throughout scalp, small areas of skin atrophy on bilateral temples.    Kenalog intralesional injection procedure note (performed by faculty): After verbal consent and discussion of risks including but not limited to atrophy, pain, and bruising,  time out was performed, 2 total cc of Kenalog 10 mg/cc was injected into 20 sites on the scalp.  The patient tolerated the procedure well and left the Dermatology clinic in good condition.    Continue Clobex 0.05% shampoo 3-4x weekly    Start Derma-Smoothe/FS (fluocinolone acetonide) oil application. Apply 2 cc to entire scalp 1x weekly for at least 4 hours at a time to overnight application.    Follow-up in 2 months, earlier for new or changing lesions.    Staff Involved:  Scribe/Staff    Scribe Disclosure  I, Marci Gale, am serving as a scribe to document services personally performed by Dr. Valery Santoyo MD, based on data collection and the provider's  statements to me.    Provider Disclosure:   I agree with above History, Review of Systems, Physical exam and Plan. I have reviewed the content of the documentation and have edited it as needed. I have personally performed the services documented here and the documentation accurately represents those services and the decisions I have made.  ILK injections were done by me.    Valery Santoyo MD  Professor and Chair  Department of Dermatology  [facility]        Nahed Ruiz D.O.    [address] [place] [id number] [phone] (Fax)  on close of encounter

## 2019-11-09 NOTE — PROGRESS NOTES
Pictures were placed in Pt's chart today for future reference.

## 2019-11-09 NOTE — NURSING NOTE
Drug Administration Record    Prior to injection, verified patient identity using patient's name and date of birth.  Due to injection administration, patient instructed to remain in clinic for 15 minutes  afterwards, and to report any adverse reaction to me immediately.    Drug Name: triamcinolone acetonide(kenalog)  Dose: 2mL of triamcinolone 10mg/mL, 20mg dose  Route administered: ID  NDC #: [id number]: Kenalog-10 ([id number])  Amount of waste(mL):3  Reason for waste: Multi dose vial    LOT #: [id number]  SITE: body  : [employer]  EXPIRATION DATE: APR 2021

## 2019-11-09 NOTE — NURSING NOTE
Dermatology Rooming Note    Shea Siddiqui's goals for this visit include:   Chief Complaint   Patient presents with     Derm Problem     Shea is here for a hairloss follow up.         Danielle Schwartz LPN

## 2019-11-20 ENCOUNTER — TELEPHONE (OUTPATIENT)
Dept: DERMATOLOGY | Facility: CLINIC | Age: 34
End: 2019-11-20

## 2019-11-20 NOTE — TELEPHONE ENCOUNTER
Prior Authorization Retail Medication Request    Medication/Dose: clobetasol propionate (CLOBEX) 0.05 % external shampoo  ICD code (if different than what is on RX): Alopecia areata [L63.9]  - Primary   Previously Tried and Failed: predniSONE (DELTASONE) 10 MG tablet, clobetasol propionate (OLUX) 0.05 % FOAM       Insurance Name: [facility]  Insurance ID: [id number]

## 2019-11-22 NOTE — TELEPHONE ENCOUNTER
Central Prior Authorization Team   Phone: [phone]      PA Initiation    Medication: clobetasol propionate (CLOBEX) 0.05 % external shampoo-PA initiated  Insurance Company: [employer] - Phone [phone] Fax [phone]  Pharm[facility]illing the Rx[id number] - [place], [place] - [address] AT [facility] & [address]  Filling Pharmacy Phone: [phone]  Filling Pharmacy Fax:    Start Date: 11/22/2019

## 2019-11-26 DIAGNOSIS — L63.9 ALOPECIA AREATA: ICD-10-CM

## 2019-11-26 RX ORDER — PREDNISONE 5 MG/1
TABLET ORAL
Qty: 100 TABLET | Refills: 0 | Status: SHIPPED | OUTPATIENT
Start: 2019-11-26

## 2019-11-26 NOTE — TELEPHONE ENCOUNTER
Prior Authorization Approval    Authorization Effective Date: 10/23/2019  Authorization Expiration Date: 11/21/2020  Medication: clobetasol propionate (CLOBEX) 0.05 % external shampoo-PA approved  Approved Dose/Quantity:  Reference #: [id number]   Insurance Company: [employer] - Phone [phone] Fax [phone]  Expected CoPay:       CoPay Card Available:      Foundation Assistance Needed:    Which Pharmacy is filling the prescription (Not needed for infusion/clinic administered): [id number] - [place], [place] - [address] [facility] [address] [facility]  Pharmacy Notified: Yes  Patient Notified: No-Pharmacy will contact

## 2020-01-02 DIAGNOSIS — L63.9 ALOPECIA AREATA: Primary | ICD-10-CM

## 2020-01-02 RX ORDER — PREDNISONE 5 MG/1
20 TABLET ORAL DAILY
Qty: 30 TABLET | Refills: 1 | Status: SHIPPED | OUTPATIENT
Start: 2020-01-02

## 2020-01-21 ENCOUNTER — OFFICE VISIT (OUTPATIENT)
Dept: DERMATOLOGY | Facility: CLINIC | Age: 35
End: 2020-01-21
Payer: COMMERCIAL

## 2020-01-21 VITALS — HEART RATE: 70 BPM | SYSTOLIC BLOOD PRESSURE: 118 MMHG | DIASTOLIC BLOOD PRESSURE: 66 MMHG

## 2020-01-21 DIAGNOSIS — L63.9 ALOPECIA AREATA: ICD-10-CM

## 2020-01-21 DIAGNOSIS — M35.9 AUTOIMMUNE DISEASE (H): ICD-10-CM

## 2020-01-21 DIAGNOSIS — L63.9 ALOPECIA AREATA: Primary | ICD-10-CM

## 2020-01-21 DIAGNOSIS — Z79.899 MEDICATION MANAGEMENT: ICD-10-CM

## 2020-01-21 LAB
ALBUMIN SERPL-MCNC: 3.9 G/DL (ref 3.4–5)
ALP SERPL-CCNC: 57 U/L (ref 40–150)
ALT SERPL W P-5'-P-CCNC: 23 U/L (ref 0–50)
ANION GAP SERPL CALCULATED.3IONS-SCNC: 5 MMOL/L (ref 3–14)
AST SERPL W P-5'-P-CCNC: 10 U/L (ref 0–45)
BASOPHILS # BLD AUTO: 0 10E9/L (ref 0–0.2)
BASOPHILS NFR BLD AUTO: 0.2 %
BILIRUB SERPL-MCNC: 0.3 MG/DL (ref 0.2–1.3)
BUN SERPL-MCNC: 21 MG/DL (ref 7–30)
CALCIUM SERPL-MCNC: 9.3 MG/DL (ref 8.5–10.1)
CHLORIDE SERPL-SCNC: 106 MMOL/L (ref 94–109)
CO2 SERPL-SCNC: 27 MMOL/L (ref 20–32)
CREAT SERPL-MCNC: 0.88 MG/DL (ref 0.52–1.04)
DIFFERENTIAL METHOD BLD: ABNORMAL
EOSINOPHIL # BLD AUTO: 0 10E9/L (ref 0–0.7)
EOSINOPHIL NFR BLD AUTO: 0.2 %
ERYTHROCYTE [DISTWIDTH] IN BLOOD BY AUTOMATED COUNT: 13.3 % (ref 10–15)
FERRITIN SERPL-MCNC: 25 NG/ML (ref 12–150)
GFR SERPL CREATININE-BSD FRML MDRD: 84 ML/MIN/{1.73_M2}
GLUCOSE SERPL-MCNC: 120 MG/DL (ref 70–99)
HCT VFR BLD AUTO: 41.3 % (ref 35–47)
HGB BLD-MCNC: 13.2 G/DL (ref 11.7–15.7)
IMM GRANULOCYTES # BLD: 0.1 10E9/L (ref 0–0.4)
IMM GRANULOCYTES NFR BLD: 0.6 %
IRON SATN MFR SERPL: 20 % (ref 15–46)
IRON SERPL-MCNC: 69 UG/DL (ref 35–180)
LYMPHOCYTES # BLD AUTO: 1.2 10E9/L (ref 0.8–5.3)
LYMPHOCYTES NFR BLD AUTO: 8.9 %
MCH RBC QN AUTO: 29 PG (ref 26.5–33)
MCHC RBC AUTO-ENTMCNC: 32 G/DL (ref 31.5–36.5)
MCV RBC AUTO: 91 FL (ref 78–100)
MONOCYTES # BLD AUTO: 0.1 10E9/L (ref 0–1.3)
MONOCYTES NFR BLD AUTO: 0.9 %
NEUTROPHILS # BLD AUTO: 11.6 10E9/L (ref 1.6–8.3)
NEUTROPHILS NFR BLD AUTO: 89.2 %
NRBC # BLD AUTO: 0 10*3/UL
NRBC BLD AUTO-RTO: 0 /100
PLATELET # BLD AUTO: 328 10E9/L (ref 150–450)
POTASSIUM SERPL-SCNC: 4.1 MMOL/L (ref 3.4–5.3)
PROT SERPL-MCNC: 7.5 G/DL (ref 6.8–8.8)
RBC # BLD AUTO: 4.55 10E12/L (ref 3.8–5.2)
SODIUM SERPL-SCNC: 138 MMOL/L (ref 133–144)
TIBC SERPL-MCNC: 336 UG/DL (ref 240–430)
TSH SERPL DL<=0.005 MIU/L-ACNC: 0.7 MU/L (ref 0.4–4)
WBC # BLD AUTO: 13 10E9/L (ref 4–11)

## 2020-01-21 ASSESSMENT — PAIN SCALES - GENERAL: PAINLEVEL: NO PAIN (0)

## 2020-01-21 NOTE — PROGRESS NOTES
[facility] Dermatology Note      Dermatology Problem List:  1.Alopecia areata: low grade activity despite prednisone 20 mg daily - will increase dose  - 35mg daily followed by taper as outlined in the AVS (35 daily, 30 daily, 30/20 every other day, 30/10 every other day, 30/5 every other day, 30 every other day); labs today; consider starting a ONEIL inhibitor in the future  - Current treatment: ILK on 11/09/19, and Clobex shampoo 0.05% 3-4x weekly, derma-smoothe/fs oil 1x weekly  - Has responded to ILK in the past (pt is seen here and at [facility])  2. 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 History of contact dermatitis, L ring finger: Suspect ACD 2/2 metal    Encounter Date: Jan 21, 2020    CC:  Chief Complaint   Patient presents with     Hair Loss     hair loss f/u - Shea states that things have gotten worse         History of Present Illness:  Ms. Shea Siddiqui is a 34 year old female who presents as a follow-up for Alopecia areata. The patient was last seen 11/9/19 when she received ILK 10  and was started on derma-smoothe/fluocinolone.  She states that right after her last appointment her hair started to fall out again. Her scalp  was painful and pruritic at that time.  She also notes that her grandmother passed away in September and feels that that may have been a stressor for her.  She has continued to use the clobex shampoo, but did not use her Derma-Smoothe/FS.  She had also been on a multivitamin and vitamin D, though she has not taken those for a few months.  Currently she is on 20mg of prednisone and she is curious about trying one of the newer ONEIL inhibitors.  She otherwise has been healthy and has no other concerns today.    Past Medical History:   Patient Active Problem List   Diagnosis     Alopecia areata     Autoimmune disease (H)     KP (keratosis pilaris)     Dermatitis, seborrheic     Skin atrophy     No past medical  history on file.  No past surgical history on file.    Social History:  Patient reports that she has never smoked. She has never used smokeless tobacco.    Family History:  Family History   Problem Relation Age of Onset     Cancer Paternal Grandmother         skin cancer     Skin Cancer Paternal Grandmother      Skin Cancer Maternal Grandmother        Medications:  Current Outpatient Medications   Medication Sig Dispense Refill     ALBUTEROL IN Inhale into the lungs as needed       clobetasol propionate (CLOBEX) 0.05 % external shampoo Script should read 2 to 3 times weekly 60 mL 1     clobetasol propionate (OLUX) 0.05 % FOAM Apply a golf ball size of product to affected areas nightly 6 to 7 days of the week (one day off) 100 g 3     Fluocinolone Acetonide Scalp (DERMA-SMOOTHE/FS SCALP) 0.01 % OIL oil 2 ml to scalp weekly, leave on for 4 hours or overnight, shampoo after application 60 mL 5     Ondansetron HCl (ZOFRAN PO) Take by mouth as needed for nausea or vomiting       predniSONE (DELTASONE) 10 MG tablet Take 3 pills/day for 7 days, then 2/day for 7 days, then 1/day for 7 days, then 0.5/day for 7d. 46 tablet 0     predniSONE (DELTASONE) 10 MG tablet Take 1 tablet (10 mg) by mouth every 48 hours 112 tablet 0     predniSONE (DELTASONE) 5 MG tablet Take 4 tablets (20 mg) by mouth daily 30 tablet 1     predniSONE (DELTASONE) 5 MG tablet 6 tabs in am for 5 days, then 4 tabs in am for 5 days, then 2 tabs in am for 5 days, then 1 tab daily for 5 days 100 tablet 0     Prenatal MV-Min-Fe Fum-FA-DHA (PRENATAL 1 PO) Take by mouth daily       Probiotic Product (PROBIOTIC-10 PO)        promethazine (PHENERGAN) 25 MG tablet Take by mouth every 6 hours as needed for nausea       ranitidine (ZANTAC) 150 MG capsule Take by mouth 2 times daily       triamcinolone (KENALOG) 0.1 % ointment Apply to rash on the left ring finger twice per day until resolution 30 g 1     Vitamin D, Cholecalciferol, 400 units CHEW        Allergies    Allergen Reactions     Ceclor [Cefaclor]      hives     Erythromycin      hives     Relafen [Nabumetone]      hives     Rocephin [Ceftriaxone]      hives       Review of Systems:  -Constitutional: Otherwise feeling well today, in usual state of health.  -Skin: As above in HPI. No additional skin concerns.    Physical exam:  Vitals: /66 (BP Location: Right arm, Patient Position: Sitting, Cuff Size: Adult Large)   Pulse 70   GEN: This is a well developed, well-nourished female in no acute distress, in a pleasant mood.    SKIN: Focused examination of the scalp and face and hands was performed.  -Marie skin type: II  - Compared to previous scalp exam, there are new areas of decreased density mixed with new areas of growth for an overall stable appearance  - Hair pull test negative  - Atrophic skin over her left temple  -No other lesions of concern on areas examined.     Impression/Plan:  1. Alopecia areata: slight decrease in hair density overall.  Currently on 20mg prednisone daily  - Start prednisone 35mg daily followed by taper as outlined in the AVS (35 daily, 30 daily, 30/20 every other day, 30/10 every other day, 30/5 every other day, 30 every other day)  - Continue clobex shampoo 3-4x weekly  - Start Derma-smoothe/FS oil application.  - Labwork today   - Consider Xelganz in the future     Follow-up in 6 weeks, earlier for new or changing lesions.     Staff Involved:  IGiovanni, MS4, saw and examined the patient in the presence of Dr. Santoyo    Staff Physician:  I was present with the medical student who participated in the service and in the documentation of the note. I have verified the history and personally performed the physical exam and medical decision making. I agree with the assessment and plan of care as documented in the note.       Valery Santoyo MD  Professor and Chair  Department of Dermatology  [facility]: Phone:  [phone], Fax:[phone]  [facility]: Phone: [phone], Fax: [phone]

## 2020-01-21 NOTE — PATIENT INSTRUCTIONS
Nikhil will call you with some information on clinical trials.    Prednisone taper will be:  35 mg every day for a week  Then 30 mg daily for a week  Then 30mg alternating with 20mg every other day for a week  Then 30 mg alternating with 10mg every other day for a week  Then 30mg alternating with 5mg every other day for a week  Then 30mg every other day for a week    Follow up in 6 weeks

## 2020-01-21 NOTE — NURSING NOTE
Dermatology Rooming Note    Shea Siddiqui's goals for this visit include:   Chief Complaint   Patient presents with     Hair Loss     hair loss f/u - Shea states that things have gotten worse     Yelena Jauregui, EMT

## 2020-01-21 NOTE — LETTER
1/21/2020       RE: Shea Siddiqui  [address] [facility] [address]     Dear Colleague,    Thank you for referring your patient, Shea Siddiqui, to the [facility] DERMATOLOGY at [facility]. Please see a copy of my visit note below.    [facility] Dermatology Note      Dermatology Problem List:  1.Alopecia areata: low grade activity despite prednisone 20 mg daily - will increase dose  - 35mg daily followed by taper as outlined in the AVS (35 daily, 30 daily, 30/20 every other day, 30/10 every other day, 30/5 every other day, 30 every other day); labs today; consider starting a ONEIL inhibitor in the future  - Current treatment: ILK on 11/09/19, and Clobex shampoo 0.05% 3-4x weekly, derma-smoothe/fs oil 1x weekly  - Has responded to ILK in the past (pt is seen here and at [facility])  2. 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 History of contact dermatitis, L ring finger: Suspect ACD 2/2 metal    Encounter Date: Jan 21, 2020    CC:  Chief Complaint   Patient presents with     Hair Loss     hair loss f/u - Shea states that things have gotten worse         History of Present Illness:  Ms. Shea Siddiqui is a 34 year old female who presents as a follow-up for Alopecia areata. The patient was last seen 11/9/19 when she received ILK 10  and was started on derma-smoothe/fluocinolone.  She states that right after her last appointment her hair started to fall out again. Her scalp  was painful and pruritic at that time.  She also notes that her grandmother passed away in September and feels that that may have been a stressor for her.  She has continued to use the clobex shampoo, but did not use her Derma-Smoothe/FS.  She had also been on a multivitamin and vitamin D, though she has not taken those for a few months.  Currently she is on 20mg of prednisone and she is curious about trying one of the newer ONEIL  inhibitors.  She otherwise has been healthy and has no other concerns today.    Past Medical History:   Patient Active Problem List   Diagnosis     Alopecia areata     Autoimmune disease (H)     KP (keratosis pilaris)     Dermatitis, seborrheic     Skin atrophy     No past medical history on file.  No past surgical history on file.    Social History:  Patient reports that she has never smoked. She has never used smokeless tobacco.    Family History:  Family History   Problem Relation Age of Onset     Cancer Paternal Grandmother         skin cancer     Skin Cancer Paternal Grandmother      Skin Cancer Maternal Grandmother        Medications:  Current Outpatient Medications   Medication Sig Dispense Refill     ALBUTEROL IN Inhale into the lungs as needed       clobetasol propionate (CLOBEX) 0.05 % external shampoo Script should read 2 to 3 times weekly 60 mL 1     clobetasol propionate (OLUX) 0.05 % FOAM Apply a golf ball size of product to affected areas nightly 6 to 7 days of the week (one day off) 100 g 3     Fluocinolone Acetonide Scalp (DERMA-SMOOTHE/FS SCALP) 0.01 % OIL oil 2 ml to scalp weekly, leave on for 4 hours or overnight, shampoo after application 60 mL 5     Ondansetron HCl (ZOFRAN PO) Take by mouth as needed for nausea or vomiting       predniSONE (DELTASONE) 10 MG tablet Take 3 pills/day for 7 days, then 2/day for 7 days, then 1/day for 7 days, then 0.5/day for 7d. 46 tablet 0     predniSONE (DELTASONE) 10 MG tablet Take 1 tablet (10 mg) by mouth every 48 hours 112 tablet 0     predniSONE (DELTASONE) 5 MG tablet Take 4 tablets (20 mg) by mouth daily 30 tablet 1     predniSONE (DELTASONE) 5 MG tablet 6 tabs in am for 5 days, then 4 tabs in am for 5 days, then 2 tabs in am for 5 days, then 1 tab daily for 5 days 100 tablet 0     Prenatal MV-Min-Fe Fum-FA-DHA (PRENATAL 1 PO) Take by mouth daily       Probiotic Product (PROBIOTIC-10 PO)        promethazine (PHENERGAN) 25 MG tablet Take by mouth every 6  hours as needed for nausea       ranitidine (ZANTAC) 150 MG capsule Take by mouth 2 times daily       triamcinolone (KENALOG) 0.1 % ointment Apply to rash on the left ring finger twice per day until resolution 30 g 1     Vitamin D, Cholecalciferol, 400 units CHEW        Allergies   Allergen Reactions     Ceclor [Cefaclor]      hives     Erythromycin      hives     Relafen [Nabumetone]      hives     Rocephin [Ceftriaxone]      hives       Review of Systems:  -Constitutional: Otherwise feeling well today, in usual state of health.  -Skin: As above in HPI. No additional skin concerns.    Physical exam:  Vitals: /66 (BP Location: Right arm, Patient Position: Sitting, Cuff Size: Adult Large)   Pulse 70   GEN: This is a well developed, well-nourished female in no acute distress, in a pleasant mood.    SKIN: Focused examination of the scalp and face and hands was performed.  -Marie skin type: II  - Compared to previous scalp exam, there are new areas of decreased density mixed with new areas of growth for an overall stable appearance  - Hair pull test negative  - Atrophic skin over her left temple  -No other lesions of concern on areas examined.     Impression/Plan:  1. Alopecia areata: slight decrease in hair density overall.  Currently on 20mg prednisone daily  - Start prednisone 35mg daily followed by taper as outlined in the AVS (35 daily, 30 daily, 30/20 every other day, 30/10 every other day, 30/5 every other day, 30 every other day)  - Continue clobex shampoo 3-4x weekly  - Start Derma-smoothe/FS oil application.  - Labwork today   - Consider Xelganz in the future     Follow-up in 6 weeks, earlier for new or changing lesions.     Staff Involved:  I, Giovanni Renee, MS4, saw and examined the patient in the presence of Dr. Santoyo    Staff Physician:  I was present with the medical student who participated in the service and in the documentation of the note. I have verified the history and personally  performed the physical exam and medical decision making. I agree with the assessment and plan of care as documented in the note.       Valery Santoyo MD  Professor and Chair  Department of Dermatology  [facility]: Phone: [phone], Fax:[phone]  [facility]: Phone: [phone], Fax: [phone]

## 2020-01-22 LAB — DEPRECATED CALCIDIOL+CALCIFEROL SERPL-MC: 23 UG/L (ref 20–75)

## 2020-01-22 RX ORDER — PREDNISONE 5 MG/1
TABLET ORAL
Qty: 100 TABLET | Refills: 1 | Status: SHIPPED | OUTPATIENT
Start: 2020-01-22 | End: 2020-03-18

## 2020-01-24 LAB — ZINC SERPL-MCNC: 72.3 UG/DL (ref 60–120)

## 2020-03-03 ENCOUNTER — OFFICE VISIT (OUTPATIENT)
Dept: DERMATOLOGY | Facility: CLINIC | Age: 35
End: 2020-03-03
Payer: COMMERCIAL

## 2020-03-03 VITALS — DIASTOLIC BLOOD PRESSURE: 76 MMHG | SYSTOLIC BLOOD PRESSURE: 114 MMHG | HEART RATE: 76 BPM

## 2020-03-03 DIAGNOSIS — M35.9 AUTOIMMUNE DISEASE (H): ICD-10-CM

## 2020-03-03 DIAGNOSIS — Z79.622 LONG-TERM CURRENT USE OF TOFACITINIB: ICD-10-CM

## 2020-03-03 DIAGNOSIS — L63.9 ALOPECIA AREATA: ICD-10-CM

## 2020-03-03 DIAGNOSIS — Z79.622 LONG-TERM CURRENT USE OF TOFACITINIB: Primary | ICD-10-CM

## 2020-03-03 DIAGNOSIS — Z51.81 MEDICATION MONITORING ENCOUNTER: ICD-10-CM

## 2020-03-03 LAB
ALBUMIN SERPL-MCNC: 3.6 G/DL (ref 3.4–5)
ALBUMIN UR-MCNC: NEGATIVE MG/DL
ALP SERPL-CCNC: 56 U/L (ref 40–150)
ALT SERPL W P-5'-P-CCNC: 21 U/L (ref 0–50)
ANION GAP SERPL CALCULATED.3IONS-SCNC: 3 MMOL/L (ref 3–14)
APPEARANCE UR: ABNORMAL
AST SERPL W P-5'-P-CCNC: 11 U/L (ref 0–45)
BACTERIA #/AREA URNS HPF: ABNORMAL /HPF
BASOPHILS # BLD AUTO: 0.1 10E9/L (ref 0–0.2)
BASOPHILS NFR BLD AUTO: 0.5 %
BILIRUB SERPL-MCNC: 0.4 MG/DL (ref 0.2–1.3)
BILIRUB UR QL STRIP: NEGATIVE
BUN SERPL-MCNC: 25 MG/DL (ref 7–30)
CALCIUM SERPL-MCNC: 8.5 MG/DL (ref 8.5–10.1)
CHLORIDE SERPL-SCNC: 109 MMOL/L (ref 94–109)
CHOLEST SERPL-MCNC: 192 MG/DL
CO2 SERPL-SCNC: 27 MMOL/L (ref 20–32)
COLOR UR AUTO: YELLOW
CREAT SERPL-MCNC: 0.91 MG/DL (ref 0.52–1.04)
DIFFERENTIAL METHOD BLD: NORMAL
EOSINOPHIL # BLD AUTO: 0.3 10E9/L (ref 0–0.7)
EOSINOPHIL NFR BLD AUTO: 2.6 %
ERYTHROCYTE [DISTWIDTH] IN BLOOD BY AUTOMATED COUNT: 13.2 % (ref 10–15)
GFR SERPL CREATININE-BSD FRML MDRD: 81 ML/MIN/{1.73_M2}
GLUCOSE SERPL-MCNC: 88 MG/DL (ref 70–99)
GLUCOSE UR STRIP-MCNC: NEGATIVE MG/DL
HCT VFR BLD AUTO: 41.8 % (ref 35–47)
HDLC SERPL-MCNC: 82 MG/DL
HGB BLD-MCNC: 13.4 G/DL (ref 11.7–15.7)
HGB UR QL STRIP: NEGATIVE
IMM GRANULOCYTES # BLD: 0 10E9/L (ref 0–0.4)
IMM GRANULOCYTES NFR BLD: 0.3 %
KETONES UR STRIP-MCNC: NEGATIVE MG/DL
LDLC SERPL CALC-MCNC: 92 MG/DL
LEUKOCYTE ESTERASE UR QL STRIP: ABNORMAL
LYMPHOCYTES # BLD AUTO: 3.6 10E9/L (ref 0.8–5.3)
LYMPHOCYTES NFR BLD AUTO: 35.1 %
MCH RBC QN AUTO: 29.4 PG (ref 26.5–33)
MCHC RBC AUTO-ENTMCNC: 32.1 G/DL (ref 31.5–36.5)
MCV RBC AUTO: 92 FL (ref 78–100)
MONOCYTES # BLD AUTO: 0.6 10E9/L (ref 0–1.3)
MONOCYTES NFR BLD AUTO: 5.9 %
NEUTROPHILS # BLD AUTO: 5.7 10E9/L (ref 1.6–8.3)
NEUTROPHILS NFR BLD AUTO: 55.6 %
NITRATE UR QL: NEGATIVE
NONHDLC SERPL-MCNC: 111 MG/DL
NRBC # BLD AUTO: 0 10*3/UL
NRBC BLD AUTO-RTO: 0 /100
PH UR STRIP: 5 PH (ref 5–7)
PLATELET # BLD AUTO: 277 10E9/L (ref 150–450)
POTASSIUM SERPL-SCNC: 3.7 MMOL/L (ref 3.4–5.3)
PROT SERPL-MCNC: 7 G/DL (ref 6.8–8.8)
RBC # BLD AUTO: 4.56 10E12/L (ref 3.8–5.2)
RBC #/AREA URNS AUTO: 6 /HPF (ref 0–2)
SODIUM SERPL-SCNC: 139 MMOL/L (ref 133–144)
SOURCE: ABNORMAL
SP GR UR STRIP: 1.02 (ref 1–1.03)
SQUAMOUS #/AREA URNS AUTO: 6 /HPF (ref 0–1)
TRIGL SERPL-MCNC: 94 MG/DL
UROBILINOGEN UR STRIP-MCNC: 0 MG/DL (ref 0–2)
WBC # BLD AUTO: 10.2 10E9/L (ref 4–11)
WBC #/AREA URNS AUTO: 15 /HPF (ref 0–5)

## 2020-03-03 ASSESSMENT — PAIN SCALES - GENERAL: PAINLEVEL: NO PAIN (0)

## 2020-03-03 NOTE — PROGRESS NOTES
"[facility] Dermatology Note      Dermatology Problem List:  1.Alopecia areata: low grade activity despite prednisone 20 mg daily - will increase dose  - 35mg daily followed by taper as outlined in the AVS (35 daily, 30 daily, 30/20 every other day, 30/10 every other day, 30/5 every other day, 30 every other day); labs today; consider starting a ONEIL inhibitor in the future  - Current treatment: ILK on 11/09/19 and Clobex shampoo 0.05% 3-4x weekly, derma-smoothe/fs oil 1x weekly  - Has responded to ILK in the past (pt is seen here and at [facility])  2. 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 History of contact dermatitis, L ring finger: Suspect ACD 2/2 metal    Encounter Date: Mar 3, 2020    CC:  No chief complaint on file.        History of Present Illness:  Ms. Shea Siddiqui is a 34 year old female who presents as a follow-up for alopecia areata. The patient was last seen 1/21/2020, when prednisone was increased to 35mg daily followed by taper. Labs including: CBC, CMP, TSH, iron/TIBC and zinc came back wnl, although vitamin D low (23) and low ferritin (25).    Today she reports continual hair shedding despite being on prednisone. Currently taking 30mg alternating with 5mg every other day. Also notes intermittent hot flashes, dizziness and \"feeling foggy\" from prednisone. She has taken prednisone multiple times in the past with similar side effects, but this is the first time it has improved her hairloss. She has noticed new alopecic patches over her parietal scalp.  Has had few days of scalp itching and discomfort but not in the last two weeks. Denies scalp flaking. Currently using Biolage shampoo and clobetasol shampoo 2-3x per week. Has not been using the dermasmooth/FS oil.     Has noted mild nail pitting. No change in lash or brow density.   She has otherwise been feeling well. Takes one Vitamin D tablet per day but is unsure how many " international unit(s) this is.     Past Medical History:   Patient Active Problem List   Diagnosis     Alopecia areata     Autoimmune disease (H)     KP (keratosis pilaris)     Dermatitis, seborrheic     Skin atrophy     No past medical history on file.  No past surgical history on file.    Social History:  Patient reports that she has never smoked. She has never used smokeless tobacco.    Family History:  Family History   Problem Relation Age of Onset     Cancer Paternal Grandmother         skin cancer     Skin Cancer Paternal Grandmother      Skin Cancer Maternal Grandmother        Medications:  Current Outpatient Medications   Medication Sig Dispense Refill     ALBUTEROL IN Inhale into the lungs as needed       clobetasol propionate (CLOBEX) 0.05 % external shampoo Script should read 2 to 3 times weekly 60 mL 1     clobetasol propionate (OLUX) 0.05 % FOAM Apply a golf ball size of product to affected areas nightly 6 to 7 days of the week (one day off) 100 g 3     Fluocinolone Acetonide Scalp (DERMA-SMOOTHE/FS SCALP) 0.01 % OIL oil 2 ml to scalp weekly, leave on for 4 hours or overnight, shampoo after application 60 mL 5     Ondansetron HCl (ZOFRAN PO) Take by mouth as needed for nausea or vomiting       predniSONE (DELTASONE) 10 MG tablet Take 3 pills/day for 7 days, then 2/day for 7 days, then 1/day for 7 days, then 0.5/day for 7d. 46 tablet 0     predniSONE (DELTASONE) 10 MG tablet Take 1 tablet (10 mg) by mouth every 48 hours 112 tablet 0     predniSONE (DELTASONE) 5 MG tablet 3.5 tabs daily for 1 week, then 3 tabs daily for 1 week,, then 3 tabs alternating with 2 tabs every other day for 1 week, then 3 tabs alternating with 1 tab every other day, then 3 tabs alternating with 1/2 tab every other day for 1 week, then 3 tabs every other day 100 tablet 1     predniSONE (DELTASONE) 5 MG tablet Take 4 tablets (20 mg) by mouth daily 30 tablet 1     predniSONE (DELTASONE) 5 MG tablet 6 tabs in am for 5 days, then 4  tabs in am for 5 days, then 2 tabs in am for 5 days, then 1 tab daily for 5 days 100 tablet 0     Prenatal MV-Min-Fe Fum-FA-DHA (PRENATAL 1 PO) Take by mouth daily       Probiotic Product (PROBIOTIC-10 PO)        promethazine (PHENERGAN) 25 MG tablet Take by mouth every 6 hours as needed for nausea       ranitidine (ZANTAC) 150 MG capsule Take by mouth 2 times daily       triamcinolone (KENALOG) 0.1 % ointment Apply to rash on the left ring finger twice per day until resolution 30 g 1     Vitamin D, Cholecalciferol, 400 units CHEW        Allergies   Allergen Reactions     Ceclor [Cefaclor]      hives     Erythromycin      hives     Relafen [Nabumetone]      hives     Rocephin [Ceftriaxone]      hives         Review of Systems:  -Constitutional: Otherwise feeling well today, in usual state of health.  -HEENT: Patient denies nonhealing oral sores.  -Skin: As above in HPI. No additional skin concerns.    Physical exam:  Vitals: BP  - 114/76 and pulse, 76  GEN: This is a well developed, well-nourished female in no acute distress, in a pleasant mood.    SKIN: Focused examination of the scalp, face and hands was performed.  -Marie skin type: II  - Compared to previous scalp exam, there are new areas of decreased density.  - Hair pull test negative  - Atrophic skin over her left temple  -No other lesions of concern on areas examined.     Impression/Plan:  1. Alopecia areata: slight decrease in hair density overall. Currently altering 35mg prednisone with 5mg daily. We discussed options for management at this time including ILK, Xelganz, PRP. She is interested and xeljanz and would like to proceed with getting baseline labs. od condition.    Continue clobex shampoo 3-4x weekly    Discussed with patient tofacitinib as an option. Discussed benefits and risks of medication including but not limited to risk of serious infection, lymphoma, other malignancies, abnormalities in CBC, liver dysfunction, Zoster reactivation,   URI and renal disease. The patient was counseled to hold dose if he/she feels ill and to contact clinic. Vaccinations reviewed. The patient denies conduction abnormalities, syncope or arrhythmia, ischemic heart disease, heart failure, and is not receiving concomitant therapy known to decrease heart rate or prolong the WI interval. There is no history of GI perforation, diverticulitis or interstitial lung disease. Patient is not currently pregnant or breastfeeding. We reviewed there may be no response, some response or robust response that is not predictable at this time.     Pending normal labs, will start tofacinitib 5mg twice daily. For tofacitinib monitoring, will obtain baseline CBC with differential, CMP, fasting lipid profile, quantiferon TB gold, HIV/Hep B core IgM/Hep B core IgG/Hep B sAG/Hep C Ab,  and HR/BP.     We will not initiate therapy if abs lymphocyte count <500 cells/mm3, absolute neutrophil count <1000 cells/mm3, hemoglobin <9 g/dL, baseline heart rate <60 bpm.For lab monitoring, will repeat CBC with diff, CMP, fasting lipid profile in 4 weeks, 8 weeks, and if stable then every 3 months thereafter.     Skin exam for skin cancer will also be periodically performed.  Will need to review with patient at each visit symptoms of nasopharyngitis, myalgias, cardiovascular effects,  abdominal symptoms including diarrhea, nausea, headache, parasthesias, recent hospitalizations/illnesses, new or changing moles. Acne and weight gain have also been reported.    HR/BP will be obtained at each clinic visit.       Follow-up in 6 weeks, earlier for new or changing lesions.     Staff Involved:  IAlejandro, saw and examined the patient in the presence of Dr. Santoyo.    Staff Physician:  I was present with the medical student who participated in the service and in the documentation of the note. I have verified the history and personally performed the physical exam and medical decision making. I agree with  the assessment and plan of care as documented in the note.  ILK injections were done  together.      Valery Santoyo MD  Professor and Chair  Department of Dermatology  [facility]: Phone: [phone], Fax:[phone]  [facility]: Phone: [phone], Fax: [phone]

## 2020-03-03 NOTE — LETTER
"3/3/2020       RE: Shea Siddiqui  [address] [facility] [address]     Dear Colleague,    Thank you for referring your patient, Shea Siddiqui, to the [facility] DERMATOLOGY at [facility]. Please see a copy of my visit note below.    [facility] Dermatology Note      Dermatology Problem List:  1.Alopecia areata: low grade activity despite prednisone 20 mg daily - will increase dose  - 35mg daily followed by taper as outlined in the AVS (35 daily, 30 daily, 30/20 every other day, 30/10 every other day, 30/5 every other day, 30 every other day); labs today; consider starting a ONEIL inhibitor in the future  - Current treatment: ILK on 11/09/19 and Clobex shampoo 0.05% 3-4x weekly, derma-smoothe/fs oil 1x weekly  - Has responded to ILK in the past (pt is seen here and at [facility])  2. 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 History of contact dermatitis, L ring finger: Suspect ACD 2/2 metal    Encounter Date: Mar 3, 2020    CC:  No chief complaint on file.        History of Present Illness:  Ms. Shea Siddiqui is a 34 year old female who presents as a follow-up for alopecia areata. The patient was last seen 1/21/2020, when prednisone was increased to 35mg daily followed by taper. Labs including: CBC, CMP, TSH, iron/TIBC and zinc came back wnl, although vitamin D low (23) and low ferritin (25).    Today she reports continual hair shedding despite being on prednisone. Currently taking 30mg alternating with 5mg every other day. Also notes intermittent hot flashes, dizziness and \"feeling foggy\" from prednisone. She has taken prednisone multiple times in the past with similar side effects, but this is the first time it has improved her hairloss. She has noticed new alopecic patches over her parietal scalp.  Has had few days of scalp itching and discomfort but not in the last two weeks. Denies scalp flaking. " Currently using Biolage shampoo and clobetasol shampoo 2-3x per week. Has not been using the dermasmooth/FS oil.     Has noted mild nail pitting. No change in lash or brow density.   She has otherwise been feeling well. Takes one Vitamin D tablet per day but is unsure how many international unit(s) this is.     Past Medical History:   Patient Active Problem List   Diagnosis     Alopecia areata     Autoimmune disease (H)     KP (keratosis pilaris)     Dermatitis, seborrheic     Skin atrophy     No past medical history on file.  No past surgical history on file.    Social History:  Patient reports that she has never smoked. She has never used smokeless tobacco.    Family History:  Family History   Problem Relation Age of Onset     Cancer Paternal Grandmother         skin cancer     Skin Cancer Paternal Grandmother      Skin Cancer Maternal Grandmother        Medications:  Current Outpatient Medications   Medication Sig Dispense Refill     ALBUTEROL IN Inhale into the lungs as needed       clobetasol propionate (CLOBEX) 0.05 % external shampoo Script should read 2 to 3 times weekly 60 mL 1     clobetasol propionate (OLUX) 0.05 % FOAM Apply a golf ball size of product to affected areas nightly 6 to 7 days of the week (one day off) 100 g 3     Fluocinolone Acetonide Scalp (DERMA-SMOOTHE/FS SCALP) 0.01 % OIL oil 2 ml to scalp weekly, leave on for 4 hours or overnight, shampoo after application 60 mL 5     Ondansetron HCl (ZOFRAN PO) Take by mouth as needed for nausea or vomiting       predniSONE (DELTASONE) 10 MG tablet Take 3 pills/day for 7 days, then 2/day for 7 days, then 1/day for 7 days, then 0.5/day for 7d. 46 tablet 0     predniSONE (DELTASONE) 10 MG tablet Take 1 tablet (10 mg) by mouth every 48 hours 112 tablet 0     predniSONE (DELTASONE) 5 MG tablet 3.5 tabs daily for 1 week, then 3 tabs daily for 1 week,, then 3 tabs alternating with 2 tabs every other day for 1 week, then 3 tabs alternating with 1 tab every  other day, then 3 tabs alternating with 1/2 tab every other day for 1 week, then 3 tabs every other day 100 tablet 1     predniSONE (DELTASONE) 5 MG tablet Take 4 tablets (20 mg) by mouth daily 30 tablet 1     predniSONE (DELTASONE) 5 MG tablet 6 tabs in am for 5 days, then 4 tabs in am for 5 days, then 2 tabs in am for 5 days, then 1 tab daily for 5 days 100 tablet 0     Prenatal MV-Min-Fe Fum-FA-DHA (PRENATAL 1 PO) Take by mouth daily       Probiotic Product (PROBIOTIC-10 PO)        promethazine (PHENERGAN) 25 MG tablet Take by mouth every 6 hours as needed for nausea       ranitidine (ZANTAC) 150 MG capsule Take by mouth 2 times daily       triamcinolone (KENALOG) 0.1 % ointment Apply to rash on the left ring finger twice per day until resolution 30 g 1     Vitamin D, Cholecalciferol, 400 units CHEW        Allergies   Allergen Reactions     Ceclor [Cefaclor]      hives     Erythromycin      hives     Relafen [Nabumetone]      hives     Rocephin [Ceftriaxone]      hives         Review of Systems:  -Constitutional: Otherwise feeling well today, in usual state of health.  -HEENT: Patient denies nonhealing oral sores.  -Skin: As above in HPI. No additional skin concerns.    Physical exam:  Vitals: BP  - 114/76 and pulse, 76  GEN: This is a well developed, well-nourished female in no acute distress, in a pleasant mood.    SKIN: Focused examination of the scalp, face and hands was performed.  -Marie skin type: II  - Compared to previous scalp exam, there are new areas of decreased density.  - Hair pull test negative  - Atrophic skin over her left temple  -No other lesions of concern on areas examined.     Impression/Plan:  1. Alopecia areata: slight decrease in hair density overall. Currently altering 35mg prednisone with 5mg daily. We discussed options for management at this time including ILK, Xelganz, PRP. She is interested and xeljanz and would like to proceed with getting baseline labs. od  condition.    Continue clobex shampoo 3-4x weekly    Discussed with patient tofacitinib as an option. Discussed benefits and risks of medication including but not limited to risk of serious infection, lymphoma, other malignancies, abnormalities in CBC, liver dysfunction, Zoster reactivation,  URI and renal disease. The patient was counseled to hold dose if he/she feels ill and to contact clinic. Vaccinations reviewed. The patient denies conduction abnormalities, syncope or arrhythmia, ischemic heart disease, heart failure, and is not receiving concomitant therapy known to decrease heart rate or prolong the MT interval. There is no history of GI perforation, diverticulitis or interstitial lung disease. Patient is not currently pregnant or breastfeeding. We reviewed there may be no response, some response or robust response that is not predictable at this time.     Pending normal labs, will start tofacinitib 5mg twice daily. For tofacitinib monitoring, will obtain baseline CBC with differential, CMP, fasting lipid profile, quantiferon TB gold, HIV/Hep B core IgM/Hep B core IgG/Hep B sAG/Hep C Ab,  and HR/BP.     We will not initiate therapy if abs lymphocyte count <500 cells/mm3, absolute neutrophil count <1000 cells/mm3, hemoglobin <9 g/dL, baseline heart rate <60 bpm.For lab monitoring, will repeat CBC with diff, CMP, fasting lipid profile in 4 weeks, 8 weeks, and if stable then every 3 months thereafter.     Skin exam for skin cancer will also be periodically performed.  Will need to review with patient at each visit symptoms of nasopharyngitis, myalgias, cardiovascular effects,  abdominal symptoms including diarrhea, nausea, headache, parasthesias, recent hospitalizations/illnesses, new or changing moles. Acne and weight gain have also been reported.    HR/BP will be obtained at each clinic visit.       Follow-up in 6 weeks, earlier for new or changing lesions.     Staff Involved:  Alejandro MORSE saw and  examined the patient in the presence of Dr. Santoyo.    Staff Physician:  I was present with the medical student who participated in the service and in the documentation of the note. I have verified the history and personally performed the physical exam and medical decision making. I agree with the assessment and plan of care as documented in the note.  ILK injections were done  together.      Valery Santoyo MD  Professor and Chair  Department of Dermatology  [facility] [place][note 2]: Phone: [phone], Fax:[phone]  [facility]: Phone: [phone], Fax: [phone]

## 2020-03-04 LAB
BACTERIA SPEC CULT: NORMAL
HBV CORE AB SERPL QL IA: NONREACTIVE
HBV CORE IGM SERPL QL IA: NONREACTIVE
HBV SURFACE AG SERPL QL IA: NONREACTIVE
HCV AB SERPL QL IA: REACTIVE
HIV 1+2 AB+HIV1 P24 AG SERPL QL IA: NONREACTIVE
SPECIMEN SOURCE: NORMAL

## 2020-03-05 LAB
GAMMA INTERFERON BACKGROUND BLD IA-ACNC: 0.01 IU/ML
M TB IFN-G BLD-IMP: NEGATIVE
M TB IFN-G CD4+ BCKGRND COR BLD-ACNC: 7.58 IU/ML
MITOGEN IGNF BCKGRD COR BLD-ACNC: 0 IU/ML
MITOGEN IGNF BCKGRD COR BLD-ACNC: 0.01 IU/ML

## 2020-03-10 ENCOUNTER — HEALTH MAINTENANCE LETTER (OUTPATIENT)
Age: 35
End: 2020-03-10

## 2020-03-16 DIAGNOSIS — M35.9 AUTOIMMUNE DISEASE (H): ICD-10-CM

## 2020-03-16 DIAGNOSIS — L63.9 ALOPECIA AREATA: ICD-10-CM

## 2020-03-19 RX ORDER — PREDNISONE 5 MG/1
TABLET ORAL
Qty: 100 TABLET | Refills: 1 | OUTPATIENT
Start: 2020-03-19

## 2020-04-27 ENCOUNTER — VIRTUAL VISIT (OUTPATIENT)
Dept: DERMATOLOGY | Facility: CLINIC | Age: 35
End: 2020-04-27
Payer: COMMERCIAL

## 2020-04-27 DIAGNOSIS — L63.9 ALOPECIA AREATA: Primary | ICD-10-CM

## 2020-04-27 RX ORDER — CLOBETASOL PROPIONATE 0.05 G/100ML
SHAMPOO TOPICAL
Qty: 60 ML | Refills: 11 | Status: SHIPPED | OUTPATIENT
Start: 2020-04-27

## 2020-04-27 RX ORDER — PREDNISONE 5 MG/1
TABLET ORAL
Qty: 150 TABLET | Refills: 0 | Status: SHIPPED | OUTPATIENT
Start: 2020-04-27 | End: 2020-06-26

## 2020-04-27 NOTE — NURSING NOTE
"Chief Complaint   Patient presents with     Derm Problem     Shea is being seen today for hairloss. She states\" I am still losing hair, I also have regrowth where I had injections at my last visit\"     Valery Johnson, RMALBARO  "

## 2020-04-27 NOTE — PATIENT INSTRUCTIONS
[facility] Teledermatology Visit    Thank you for allowing us to participate in your care. Your findings, instructions and follow-up plan are as follows:    Alopecia areata  - decrease prednisone to 15 mg every other day x 1 month then 10 mg every other day   - continue clobetasol shampoo 3-4 times weekly, refilled today   - establish care with PCP or follow-up with OBGYN for positive Hep C Antibody   - no need to use derma-smoothe oil at this time   - no other changes    When should I call my doctor?    If you are worsening or not improving, please, contact us or seek urgent care as noted below.     Who should I call with questions?    [facility]: [phone]     [facility]: [phone]    If this is a medical emergency and you are unable to reach an ER, Call [phone]

## 2020-04-27 NOTE — PROGRESS NOTES
[facility]  Store and Forward and Video ( Invitation sent by:  [employer] and text to cell phone )  [phone]      Impression and Recommendations (Patient Counseled on the Following):  1. Alopecia areata  Appears improved on exam today with evidence of regrowth. Initially planned to start ONEIL inhibitor therapy, but was found to have positive Hep C Ab (see below). Therefore, will hold off for now and continue prednisone taper.     -continue prednisone taper: decrease to 15 mg every other day x 1 month then 10 mg every other day x 1 month; refill provided today   -continue clobetasol 0.05% shampoo 3-4 times a week ; refill provided today   -continue holding DermaSmoothe oil   -consider ONEIL inhibitor in future pending Hep C Ab work up     2. Positive Hepatitis C Ab   Noted on baseline evaluation labs prior to starting tofacitinib as above. Asymptomatic and no evidence of transaminitis on labs.    -establish care with PCP or follow-up with OBGYN for referral to GI/Hepatology to follow-up positive Ab    Follow-up:   Follow-up with dermatology in approximately 2-3 months. Earlier for new or changing lesions or rash.      Staff and resident:    Dr. Santoyo was present during the entirety of this encounter.     Maryanne Alvares MD (PGY-2)  Dermatology Resident     Patient was seen and examined with the dermatology resident. I agree with the history, review of systems, physical examination, assessments and plan.    Valery Santoyo MD  Professor and  Chair  Department of Dermatology  [facility]    _____________________________________________________________________________    Dermatology Problem List:  1.Alopecia areata  - Current treatment: shampoo 0.05% 3-4x weekly  - Prior treatment: derma-smoothe/fs oil 1x weekly, ILK  - Patient is seen here and at [facility]  2. 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 History of contact dermatitis, L  "ring finger: Suspect ACD 2/2 metal  4. Positive Hep C Ab    Encounter Date: Apr 27, 2020    CC:   Chief Complaint   Patient presents with     Derm Problem     Shea is being seen today for hairloss. She states\" I am still losing hair, I also have regrowth where I had injections at my last visit\"       History of Present Illness:  We have reviewed the teledermatology information and the nursing intake corresponding to this issue. Shea iSddiqui is a 35 year old female who presents via teledermatology for follow-up alopecia area.    Today, patient reports that her AA is better with new hair growth in sites of prior ILK. However, also having some ongoing hair loss. Washing hair daily with Biolage shampoo & conditioner and clobetasol 0.05% shampoo 2-3 times a week. No longer using derma-smoothe oil as per last visit. Still on prednisone taper, currently on 20 mg every other day, has been on this regimen for the past several weeks.       Patient denies any RUQ pain, yellow skin/eyes, or feeling ill. No other positive hepatitis C testing in the past.     Enjoying spending time at home with her children.     ROS: Patient is generally feeling well today    Physical Examination:  General: Well-appearing female, appropriately-developed individual.  Skin: Focused examination within the teledermatology photograph(s) including scalp and face was performed.   -R parietal scalp improved from prior with increased regrowth, ~9% hair loss  -L parietal scalp improved from prior with increased regrowth  -Posterior/occipital scalp with larger alopecic patches in comparison to rest of scalp but also with evidence of regrowth   -Anterior-frontal scalp with small atrophic depression     Labs:  Reviewed    Past Medical History:   Patient Active Problem List   Diagnosis     Alopecia areata     Autoimmune disease (H)     KP (keratosis pilaris)     Dermatitis, seborrheic     Skin atrophy     Social History:  Patient reports that she has " never smoked. She has never used smokeless tobacco.    Family History:  Family History   Problem Relation Age of Onset     Cancer Paternal Grandmother         skin cancer     Skin Cancer Paternal Grandmother      Skin Cancer Maternal Grandmother      Medications:  Current Outpatient Medications   Medication     ALBUTEROL IN     clobetasol propionate (CLOBEX) 0.05 % external shampoo     Multiple Vitamins-Minerals (MULTIVITAMIN ADULT PO)     Ondansetron HCl (ZOFRAN PO)     predniSONE (DELTASONE) 10 MG tablet     Vitamin D, Cholecalciferol, 400 units CHEW     clobetasol propionate (OLUX) 0.05 % FOAM     Fluocinolone Acetonide Scalp (DERMA-SMOOTHE/FS SCALP) 0.01 % OIL oil     predniSONE (DELTASONE) 10 MG tablet     predniSONE (DELTASONE) 5 MG tablet     predniSONE (DELTASONE) 5 MG tablet     predniSONE (DELTASONE) 5 MG tablet     Prenatal MV-Min-Fe Fum-FA-DHA (PRENATAL 1 PO)     Probiotic Product (PROBIOTIC-10 PO)     promethazine (PHENERGAN) 25 MG tablet     ranitidine (ZANTAC) 150 MG capsule     triamcinolone (KENALOG) 0.1 % ointment     Current Facility-Administered Medications   Medication     triamcinolone acetonide (KENALOG-10) injection 10 mg     triamcinolone acetonide (KENALOG-10) injection 20 mg     Allergies   Allergen Reactions     Ceclor [Cefaclor]      hives     Erythromycin      hives     Relafen [Nabumetone]      hives     Rocephin [Ceftriaxone]      hives     _____________________________________________________________________________    Teledermatology information:  - Location of patient: Home  - Patient presented as: return  - Location of teledermatologist:  ([facility] DERMATOLOGY )  - Reason teledermatology is appropriate:  of National Emergency Regarding Coronavirus disease (COVID 19) Outbreak  - Image quality and interpretability: acceptable  - Physician has received verbal consent for a Video/Photos Visit from the patient? Yes  - In-person dermatology visit recommendation: no  - Service start  time: 11:38AM  - Service end time: 11:55 AM  - Date of report: 4/27/2020

## 2020-07-21 ENCOUNTER — OFFICE VISIT (OUTPATIENT)
Dept: DERMATOLOGY | Facility: CLINIC | Age: 35
End: 2020-07-21
Payer: COMMERCIAL

## 2020-07-21 DIAGNOSIS — M35.9 AUTOIMMUNE DISEASE (H): Primary | ICD-10-CM

## 2020-07-21 DIAGNOSIS — L63.9 ALOPECIA AREATA: ICD-10-CM

## 2020-07-21 RX ORDER — CYCLOBENZAPRINE HCL 10 MG
TABLET ORAL
COMMUNITY
Start: 2020-07-17

## 2020-07-21 RX ORDER — ONDANSETRON 4 MG/1
TABLET, ORALLY DISINTEGRATING ORAL
COMMUNITY
Start: 2020-07-17

## 2020-07-21 RX ORDER — SUMATRIPTAN 25 MG/1
25-50 TABLET, FILM COATED ORAL
COMMUNITY
Start: 2020-07-17

## 2020-07-21 ASSESSMENT — PAIN SCALES - GENERAL: PAINLEVEL: NO PAIN (0)

## 2020-07-21 NOTE — NURSING NOTE
Dermatology Rooming Note    Shea Siddiqui's goals for this visit include:   Chief Complaint   Patient presents with     Hair Loss     Shea is here today for a hair loss follow up- getting worse.      JER Rivero

## 2020-07-21 NOTE — LETTER
7/21/2020     RE: Shea Siddiqui  [address] [facility] [address]     Dear Colleague,    Thank you for referring your patient, Shea Siddiqui, to the [facility] DERMATOLOGY at [facility]. Please see a copy of my visit note below.    [facility] Dermatology Note    See completed note.    [facility] Dermatology Note      Dermatology Problem List:  1. Alopecia areata  - Current treatment: shampoo 0.05% 3-4x weekly  - Prior treatment: derma-smoothe/fs oil 1x weekly, ILK  - Patient is seen here and at [facility]  - ILK injections 7/21/20  2. 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 History of contact dermatitis, L ring finger: Suspect ACD 2/2 metal  4. Positive Hep C Ab    Encounter Date: Jul 21, 2020    CC:  Chief Complaint   Patient presents with     Hair Loss     Shea is here today for a hair loss follow up- getting worse.          History of Present Illness:  Ms. Shea Siddiqui is a 35 year old female who presents as a follow-up for Alopecia Areata. The patient was last seen in April Virtually when she was continuing her prednisone taper and we had to wait on the JAK inhibitor initiation due to positive Hep C antibody on baseline labs.      Has been getting worse since February march. Has to wear a wig now. Diffuse loss on the scalp. No loss on the eyebrows, lashes or other body hair.     Currently only using the clobetasol shampoo 1-2 times a week. Has done injections in the past and was on oral prednisone.     Just finished the oral prednisone about a weeks ago. Doesn't feel like the prednisone helped, but it has helped in the past. This course was a litle lower dose than previous course. Spots were growing where she ahd the injections but now debbie are gone again. No itching, burning, pain on the scalp. Doesn't really feel like anything is working right now.    Talked about  Tofacitinib, but were holding this because of a positive Hep C antibody. Has followed up on this and does not have hepatitis C but needs to do follow up labs to determine why she had the positive results. Planning to get labs around August or September.    Not having nay additional stresses in March really. Grandmother passed in September. Some stress with COVID, but not too bad because she was home with her kids and didn't. Have to wear her hat or wigs at all.     Back to work now since June. Has been having headaches, which she has been having for most of her life. Just started taking sumatriptan, Zofran, and cyclobenzaprine as needed. Has had to use these only once last night. No side effects so far except fatigue.      She is hoping to know more about different options such as PRP and Tofacitinib.    Past Medical History:   Patient Active Problem List   Diagnosis     Alopecia areata     Autoimmune disease (H)     KP (keratosis pilaris)     Dermatitis, seborrheic     Skin atrophy     History reviewed. No pertinent past medical history.  History reviewed. No pertinent surgical history.    Social History:  Patient reports that she has never smoked. She has never used smokeless tobacco.    Family History:  Family History   Problem Relation Age of Onset     Cancer Paternal Grandmother         skin cancer     Skin Cancer Paternal Grandmother      Skin Cancer Maternal Grandmother        Medications:  Current Outpatient Medications   Medication Sig Dispense Refill     ALBUTEROL IN Inhale into the lungs as needed       clobetasol propionate (CLOBEX) 0.05 % external shampoo Use 3-4 times weekly 60 mL 11     cyclobenzaprine (FLEXERIL) 10 MG tablet        Multiple Vitamins-Minerals (MULTIVITAMIN ADULT PO) Take by mouth daily       ondansetron (ZOFRAN-ODT) 4 MG ODT tab TAKE 1 TABLET BY MOUTH EVERY 8 HOURS AS NEED FOR NAUSEA OR VOMITING. RELEATED TO MIGRAINE HEADACHES       Ondansetron HCl (ZOFRAN PO) Take by mouth as needed  for nausea or vomiting       Prenatal MV-Min-Fe Fum-FA-DHA (PRENATAL 1 PO) Take by mouth daily       Probiotic Product (PROBIOTIC-10 PO)        promethazine (PHENERGAN) 25 MG tablet Take by mouth every 6 hours as needed for nausea       ranitidine (ZANTAC) 150 MG capsule Take by mouth 2 times daily       SUMAtriptan (IMITREX) 25 MG tablet Take 25-50 mg by mouth       Vitamin D, Cholecalciferol, 400 units CHEW        clobetasol propionate (OLUX) 0.05 % FOAM Apply a golf ball size of product to affected areas nightly 6 to 7 days of the week (one day off) (Patient not taking: Reported on 4/27/2020) 100 g 3     Fluocinolone Acetonide Scalp (DERMA-SMOOTHE/FS SCALP) 0.01 % OIL oil 2 ml to scalp weekly, leave on for 4 hours or overnight, shampoo after application (Patient not taking: Reported on 4/27/2020) 60 mL 5     predniSONE (DELTASONE) 10 MG tablet Take 3 pills/day for 7 days, then 2/day for 7 days, then 1/day for 7 days, then 0.5/day for 7d. (Patient not taking: Reported on 4/27/2020) 46 tablet 0     predniSONE (DELTASONE) 10 MG tablet Take 1 tablet (10 mg) by mouth every 48 hours (Patient not taking: Reported on 7/13/2020) 112 tablet 0     predniSONE (DELTASONE) 5 MG tablet 3.5 tabs daily for 1 week, then 3 tabs daily for 1 week,, then 3 tabs alternating with 2 tabs every other day for 1 week, then 3 tabs alternating with 1 tab every other day, then 3 tabs alternating with 1/2 tab every other day for 1 week, then 3 tabs every other day (Patient not taking: Reported on 4/27/2020) 100 tablet 1     predniSONE (DELTASONE) 5 MG tablet Take 4 tablets (20 mg) by mouth daily (Patient not taking: Reported on 4/27/2020) 30 tablet 1     predniSONE (DELTASONE) 5 MG tablet 6 tabs in am for 5 days, then 4 tabs in am for 5 days, then 2 tabs in am for 5 days, then 1 tab daily for 5 days (Patient not taking: Reported on 4/27/2020) 100 tablet 0     triamcinolone (KENALOG) 0.1 % ointment Apply to rash on the left ring finger twice per  day until resolution (Patient not taking: Reported on 4/27/2020) 30 g 1     Allergies   Allergen Reactions     Ceclor [Cefaclor]      hives     Erythromycin      hives     Relafen [Nabumetone]      hives     Rocephin [Ceftriaxone]      hives         Review of Systems:  -As per HPI  -Constitutional: Otherwise feeling well today, in usual state of health.  -HEENT: Patient denies nonhealing oral sores.  -Skin: As above in HPI. No additional skin concerns.    Physical exam:  Vitals: There were no vitals taken for this visit.  GEN: This is a well developed, well-nourished female in no acute distress, in a pleasant mood.    SKIN: Focused examination of the scalp was performed.  - Hair pull test negative   - Large patches of loss primarily on the occipital and temporal regions; loss circumferentially around the scalp  - Patch of loss on the L frontal region   - Patch of loss on the L crown   -No other lesions of concern on areas examined.     Impression/Plan:  1. Alopecia areata  Some increased loss on exam today than previous visit. Some regrowth in areas with previous loss, but new areas and mildly more widespread loss today on exam (around 10% more loss than last visit). She is wearing a wig and hats regularly now. Had considered starting ONEIL inhibitor last visit, but found to have positive Hep C Ab (see previous notes). Did more thorough work in addition to liver US. Liver US was normal. Lab evaluation determined that she is Hep C Ab positive and HCV RNA negative. She does not have Hepatitis C, but Ab was positive likely for 1 of 3 reasons including false positive, spontaneous viral clearance, or acutely infected and not yet generated significant viremia. She will be re-testing labs in 3-6 months to distinguish between these possibilities. Finished her prednisone taper a few weeks ago, but this did not help as it has in the past. Discussed PRP today and that we could start this in the coming month.  - Continue  clobetasol 0.5% shampoo 3-4 times a week  - ILK injections today with medical student and faculty. See faculty procedure note below.  - Consider ONEIL inhibitor in the future pending repeat labs; would need to work with GI to see if ONEIL inhibitor would be a good choice for her  - Consider PRP in the future; 3 treatments a month apart      - Kenalog intralesional injection procedure note (performed by faculty): After verbal consent and discussion of risks including but not limited to atrophy, pain, and bruising,  time out was performed, 3 total cc of Kenalog 10 mg/cc was injected into 30 sites on the scalp.  The patient tolerated the procedure well and left the Dermatology clinic in good condition.          CC ESTABLISHED PATIENT  No address on file on close of this encounter.  Follow-up ideally in 6-8 weeks    Staff Involved:  Dacia MORSE, saw and examined the patient in the presence of Dr. Santoyo.    Dacia Palacio, MS4  [employer]             [facility] Dermatology Note      Dermatology Problem List:  1. Alopecia areata  - Current treatment: shampoo 0.05% 3-4x weekly  - Prior treatment: derma-smoothe/fs oil 1x weekly, ILK, steroid taper (completed around 7/14/20)  - Patient is seen here and at [facility]  - ILK injections 7/21/20  2. 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 History of contact dermatitis, L ring finger: Suspect ACD 2/2 metal  4. Positive Hep C Ab  - Liver ultrasound normal  - Hep C AB positive, HCV RNA negative  - doing repeat labs in 3-6 months (from May 2020)  - Antibodies positive for 1 of 3 reasons including false positive, spontaneous viral clearance, or acutely infected and not yet generated significant viremia.    Encounter Date: Jul 21, 2020    CC:  Chief Complaint   Patient presents with     Hair Loss     Shea is here today for a hair loss follow up- getting worse.          History of  Present Illness:  Ms. Shea Siddiqui is a 35 year old female who presents as a follow-up for Alopecia Areata. The patient was last seen in April virtually when she was continuing her prednisone taper and we had to wait on the JAK inhibitor initiation due to positive Hep C antibody on baseline labs.      Has been getting worse since February march. Has to wear a wig now. Diffuse loss on the scalp. No loss on the eyebrows, lashes or other body hair.     Currently only using the clobetasol shampoo 1-2 times a week. Has done injections in the past and was on oral prednisone.     Just finished the oral prednisone about a week ago. Doesn't feel like the prednisone helped, but it has helped in the past. This course was a litle lower dose than previous course. Spots were growing where she had the injections but now they are gone again. No itching, burning, pain on the scalp. Doesn't really feel like anything is working right now.    Talked about Tofacitinib, but were holding this because of a positive Hep C antibody. Has followed up on this and does not have hepatitis C but needs to do follow up labs to determine why she had the positive results. Planning to get labs around August or September.    Not having any additional stresses in March really. Grandmother passed in September. Some stress with COVID, but not too bad because she was home with her kids and didn't have to wear her hat or wigs at all.     Back to work now since June. Has been having headaches, which she has been having for most of her life. Just started taking sumatriptan, Zofran, and cyclobenzaprine as needed. Has had to use these only once last night. No side effects so far except fatigue.      She is hoping to know more about different options such as PRP and Tofacitinib.    Past Medical History:   Patient Active Problem List   Diagnosis     Alopecia areata     Autoimmune disease (H)     KP (keratosis pilaris)     Dermatitis, seborrheic     Skin  atrophy     History reviewed. No pertinent past medical history.  History reviewed. No pertinent surgical history.    Social History:  Patient reports that she has never smoked. She has never used smokeless tobacco.    Family History:  Family History   Problem Relation Age of Onset     Cancer Paternal Grandmother         skin cancer     Skin Cancer Paternal Grandmother      Skin Cancer Maternal Grandmother        Medications:  Current Outpatient Medications   Medication Sig Dispense Refill     ALBUTEROL IN Inhale into the lungs as needed       clobetasol propionate (CLOBEX) 0.05 % external shampoo Use 3-4 times weekly 60 mL 11     cyclobenzaprine (FLEXERIL) 10 MG tablet        Multiple Vitamins-Minerals (MULTIVITAMIN ADULT PO) Take by mouth daily       ondansetron (ZOFRAN-ODT) 4 MG ODT tab TAKE 1 TABLET BY MOUTH EVERY 8 HOURS AS NEED FOR NAUSEA OR VOMITING. RELEATED TO MIGRAINE HEADACHES       Ondansetron HCl (ZOFRAN PO) Take by mouth as needed for nausea or vomiting       Prenatal MV-Min-Fe Fum-FA-DHA (PRENATAL 1 PO) Take by mouth daily       Probiotic Product (PROBIOTIC-10 PO)        promethazine (PHENERGAN) 25 MG tablet Take by mouth every 6 hours as needed for nausea       ranitidine (ZANTAC) 150 MG capsule Take by mouth 2 times daily       SUMAtriptan (IMITREX) 25 MG tablet Take 25-50 mg by mouth       Vitamin D, Cholecalciferol, 400 units CHEW        clobetasol propionate (OLUX) 0.05 % FOAM Apply a golf ball size of product to affected areas nightly 6 to 7 days of the week (one day off) (Patient not taking: Reported on 4/27/2020) 100 g 3     Fluocinolone Acetonide Scalp (DERMA-SMOOTHE/FS SCALP) 0.01 % OIL oil 2 ml to scalp weekly, leave on for 4 hours or overnight, shampoo after application (Patient not taking: Reported on 4/27/2020) 60 mL 5     predniSONE (DELTASONE) 10 MG tablet Take 3 pills/day for 7 days, then 2/day for 7 days, then 1/day for 7 days, then 0.5/day for 7d. (Patient not taking: Reported on  4/27/2020) 46 tablet 0     predniSONE (DELTASONE) 10 MG tablet Take 1 tablet (10 mg) by mouth every 48 hours (Patient not taking: Reported on 7/13/2020) 112 tablet 0     predniSONE (DELTASONE) 5 MG tablet 3.5 tabs daily for 1 week, then 3 tabs daily for 1 week,, then 3 tabs alternating with 2 tabs every other day for 1 week, then 3 tabs alternating with 1 tab every other day, then 3 tabs alternating with 1/2 tab every other day for 1 week, then 3 tabs every other day (Patient not taking: Reported on 4/27/2020) 100 tablet 1     predniSONE (DELTASONE) 5 MG tablet Take 4 tablets (20 mg) by mouth daily (Patient not taking: Reported on 4/27/2020) 30 tablet 1     predniSONE (DELTASONE) 5 MG tablet 6 tabs in am for 5 days, then 4 tabs in am for 5 days, then 2 tabs in am for 5 days, then 1 tab daily for 5 days (Patient not taking: Reported on 4/27/2020) 100 tablet 0     triamcinolone (KENALOG) 0.1 % ointment Apply to rash on the left ring finger twice per day until resolution (Patient not taking: Reported on 4/27/2020) 30 g 1     Allergies   Allergen Reactions     Ceclor [Cefaclor]      hives     Erythromycin      hives     Relafen [Nabumetone]      hives     Rocephin [Ceftriaxone]      hives         Review of Systems:  -As per HPI  -Constitutional: Otherwise feeling well today, in usual state of health.  -HEENT: Patient denies nonhealing oral sores.  -Skin: As above in HPI. No additional skin concerns.    Physical exam:  Vitals: There were no vitals taken for this visit.  GEN: This is a well developed, well-nourished female in no acute distress, in a pleasant mood.    SKIN: Focused examination of the scalp. Face and hands was performed.  - Hair pull test negative   - Large patches of loss primarily on the occipital and temporal regions; loss circumferentially around the scalp  - Patch of loss on the L frontal region   - Patch of loss on the L crown   -No other lesions of concern on areas examined.     Impression/Plan:  1.  Alopecia areata  Some increased loss on exam today than previous visit. Some regrowth in areas with previous loss, but new areas and mildly more widespread loss today on exam (around 10% more loss than last visit). She is wearing a wig and hats regularly now. Had considered starting ONEIL inhibitor last visit, but found to have positive Hep C Ab (see previous notes). Did more thorough work in addition to liver US. Liver US was normal. Lab evaluation determined that she is Hep C Ab positive and HCV RNA negative. She does not have Hepatitis C, but Ab was positive likely for 1 of 3 reasons including false positive, spontaneous viral clearance, or acutely infected and not yet generated significant viremia. She will be re-testing labs in 3-6 months to distinguish between these possibilities. Finished her prednisone taper a few weeks ago, but this did not help as it has in the past. Discussed PRP today and that we could start this in the coming month.  - Continue clobetasol 0.5% shampoo 3-4 times a week  - ILK injections today with medical student and faculty. See faculty procedure note below.  - Consider ONEIL inhibitor in the future pending repeat labs; would need to work with GI to see if ONEIL inhibitor would be a good choice for her  - Consider PRP in the future; 3 treatments a month apart      - Kenalog intralesional injection procedure note (performed by faculty): After verbal consent and discussion of risks including but not limited to atrophy, pain, and bruising,  time out was performed, 3 total cc of Kenalog 10 mg/cc was injected into 30 sites on the scalp.  The patient tolerated the procedure well and left the Dermatology clinic in good condition.      Will aim to arrange 3 follow-up visits 1-2 months apart for PRP injections under the philippe given to [facility] by a grateful patient.       Staff Involved:  I,Dacia Palacio, saw and examined the patient in the presence of Dr. Santoyo.    Dacia Palacio, MS4  [facility]  [employer]    Staff Physician:  I was present with the medical student who participated in the service and in the documentation of the note. I have verified the history and personally performed the physical exam and medical decision making. I agree with the assessment and plan of care as documented in the note.  ILK injections were primarily done by me.       Valery Santoyo MD  Professor and Chair  Department of Dermatology  [facility]: Phone: [phone], Fax:[phone]  [facility]: Phone: [phone], Fax: [phone]

## 2020-07-21 NOTE — PROGRESS NOTES
[facility] Dermatology Note      Dermatology Problem List:  1. Alopecia areata  - Current treatment: shampoo 0.05% 3-4x weekly  - Prior treatment: derma-smoothe/fs oil 1x weekly, ILK, steroid taper (completed around 7/14/20)  - [facility]  - ILK injections 7/21/20  2. 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 History of contact dermatitis, L ring finger: Suspect ACD 2/2 metal  4. Positive Hep C Ab  - Liver ultrasound normal  - Hep C AB positive, HCV RNA negative  - doing repeat labs in 3-6 months (from May 2020)  - Antibodies positive for 1 of 3 reasons including false positive, spontaneous viral clearance, or acutely infected and not yet generated significant viremia.    Encounter Date: Jul 21, 2020    CC:  Chief Complaint   Patient presents with     Hair Loss     Shea is here today for a hair loss follow up- getting worse.          History of Present Illness:  Ms. Shea Siddiqui is a 35 year old female who presents as a follow-up for Alopecia Areata. The patient was last seen in April virtually when she was continuing her prednisone taper and we had to wait on the JAK inhibitor initiation due to positive Hep C antibody on baseline labs.      Has been getting worse since February march. Has to wear a wig now. Diffuse loss on the scalp. No loss on the eyebrows, lashes or other body hair.     Currently only using the clobetasol shampoo 1-2 times a week. Has done injections in the past and was on oral prednisone.     Just finished the oral prednisone about a week ago. Doesn't feel like the prednisone helped, but it has helped in the past. This course was a litle lower dose than previous course. Spots were growing where she had the injections but now they are gone again. No itching, burning, pain on the scalp. Doesn't really feel like anything is working right now.    Talked about Tofacitinib, but were holding this  because of a positive Hep C antibody. Has followed up on this and does not have hepatitis C but needs to do follow up labs to determine why she had the positive results. Planning to get labs around August or September.    Not having any additional stresses in March really. Grandmother passed in September. Some stress with COVID, but not too bad because she was home with her kids and didn't have to wear her hat or wigs at all.     Back to work now since June. Has been having headaches, which she has been having for most of her life. Just started taking sumatriptan, Zofran, and cyclobenzaprine as needed. Has had to use these only once last night. No side effects so far except fatigue.      She is hoping to know more about different options such as PRP and Tofacitinib.    Past Medical History:   Patient Active Problem List   Diagnosis     Alopecia areata     Autoimmune disease (H)     KP (keratosis pilaris)     Dermatitis, seborrheic     Skin atrophy     History reviewed. No pertinent past medical history.  History reviewed. No pertinent surgical history.    Social History:  Patient reports that she has never smoked. She has never used smokeless tobacco.    Family History:  Family History   Problem Relation Age of Onset     Cancer Paternal Grandmother         skin cancer     Skin Cancer Paternal Grandmother      Skin Cancer Maternal Grandmother        Medications:  Current Outpatient Medications   Medication Sig Dispense Refill     ALBUTEROL IN Inhale into the lungs as needed       clobetasol propionate (CLOBEX) 0.05 % external shampoo Use 3-4 times weekly 60 mL 11     cyclobenzaprine (FLEXERIL) 10 MG tablet        Multiple Vitamins-Minerals (MULTIVITAMIN ADULT PO) Take by mouth daily       ondansetron (ZOFRAN-ODT) 4 MG ODT tab TAKE 1 TABLET BY MOUTH EVERY 8 HOURS AS NEED FOR NAUSEA OR VOMITING. RELEATED TO MIGRAINE HEADACHES       Ondansetron HCl (ZOFRAN PO) Take by mouth as needed for nausea or vomiting        Prenatal MV-Min-Fe Fum-FA-DHA (PRENATAL 1 PO) Take by mouth daily       Probiotic Product (PROBIOTIC-10 PO)        promethazine (PHENERGAN) 25 MG tablet Take by mouth every 6 hours as needed for nausea       ranitidine (ZANTAC) 150 MG capsule Take by mouth 2 times daily       SUMAtriptan (IMITREX) 25 MG tablet Take 25-50 mg by mouth       Vitamin D, Cholecalciferol, 400 units CHEW        clobetasol propionate (OLUX) 0.05 % FOAM Apply a golf ball size of product to affected areas nightly 6 to 7 days of the week (one day off) (Patient not taking: Reported on 4/27/2020) 100 g 3     Fluocinolone Acetonide Scalp (DERMA-SMOOTHE/FS SCALP) 0.01 % OIL oil 2 ml to scalp weekly, leave on for 4 hours or overnight, shampoo after application (Patient not taking: Reported on 4/27/2020) 60 mL 5     predniSONE (DELTASONE) 10 MG tablet Take 3 pills/day for 7 days, then 2/day for 7 days, then 1/day for 7 days, then 0.5/day for 7d. (Patient not taking: Reported on 4/27/2020) 46 tablet 0     predniSONE (DELTASONE) 10 MG tablet Take 1 tablet (10 mg) by mouth every 48 hours (Patient not taking: Reported on 7/13/2020) 112 tablet 0     predniSONE (DELTASONE) 5 MG tablet 3.5 tabs daily for 1 week, then 3 tabs daily for 1 week,, then 3 tabs alternating with 2 tabs every other day for 1 week, then 3 tabs alternating with 1 tab every other day, then 3 tabs alternating with 1/2 tab every other day for 1 week, then 3 tabs every other day (Patient not taking: Reported on 4/27/2020) 100 tablet 1     predniSONE (DELTASONE) 5 MG tablet Take 4 tablets (20 mg) by mouth daily (Patient not taking: Reported on 4/27/2020) 30 tablet 1     predniSONE (DELTASONE) 5 MG tablet 6 tabs in am for 5 days, then 4 tabs in am for 5 days, then 2 tabs in am for 5 days, then 1 tab daily for 5 days (Patient not taking: Reported on 4/27/2020) 100 tablet 0     triamcinolone (KENALOG) 0.1 % ointment Apply to rash on the left ring finger twice per day until resolution  (Patient not taking: Reported on 4/27/2020) 30 g 1     Allergies   Allergen Reactions     Ceclor [Cefaclor]      hives     Erythromycin      hives     Relafen [Nabumetone]      hives     Rocephin [Ceftriaxone]      hives         Review of Systems:  -As per HPI  -Constitutional: Otherwise feeling well today, in usual state of health.  -HEENT: Patient denies nonhealing oral sores.  -Skin: As above in HPI. No additional skin concerns.    Physical exam:  Vitals: There were no vitals taken for this visit.  GEN: This is a well developed, well-nourished female in no acute distress, in a pleasant mood.    SKIN: Focused examination of the scalp. Face and hands was performed.  - Hair pull test negative   - Large patches of loss primarily on the occipital and temporal regions; loss circumferentially around the scalp  - Patch of loss on the L frontal region   - Patch of loss on the L crown   -No other lesions of concern on areas examined.     Impression/Plan:  1. Alopecia areata  Some increased loss on exam today than previous visit. Some regrowth in areas with previous loss, but new areas and mildly more widespread loss today on exam (around 10% more loss than last visit). She is wearing a wig and hats regularly now. Had considered starting ONEIL inhibitor last visit, but found to have positive Hep C Ab (see previous notes). Did more thorough work in addition to liver US. Liver US was normal. Lab evaluation determined that she is Hep C Ab positive and HCV RNA negative. She does not have Hepatitis C, but Ab was positive likely for 1 of 3 reasons including false positive, spontaneous viral clearance, or acutely infected and not yet generated significant viremia. She will be re-testing labs in 3-6 months to distinguish between these possibilities. Finished her prednisone taper a few weeks ago, but this did not help as it has in the past. Discussed PRP today and that we could start this in the coming month.  - Continue clobetasol  0.5% shampoo 3-4 times a week  - ILK injections today with medical student and faculty. See faculty procedure note below.  - Consider ONEIL inhibitor in the future pending repeat labs; would need to work with GI to see if ONEIL inhibitor would be a good choice for her  - Consider PRP in the future; 3 treatments a month apart      - Kenalog intralesional injection procedure note (performed by faculty): After verbal consent and discussion of risks including but not limited to atrophy, pain, and bruising,  time out was performed, 3 total cc of Kenalog 10 mg/cc was injected into 30 sites on the scalp.  The patient tolerated the procedure well and left the Dermatology clinic in good condition.      Will aim to arrange 3 follow-up visits 1-2 months apart for PRP injections under the philippe given to RUST by a grateful patient.       Staff Involved:  I,Dacia Palacio, saw and examined the patient in the presence of Dr. Santoyo.    Dacia Palacio, MS4  [employer]    Staff Physician:  I was present with the medical student who participated in the service and in the documentation of the note. I have verified the history and personally performed the physical exam and medical decision making. I agree with the assessment and plan of care as documented in the note.  ILK injections were primarily done by me.       Valery Santoyo MD  Professor and Chair  Department of Dermatology  [facility]: Phone: [phone], Fax:[phone]  [facility]: Phone: [phone], Fax: [phone]

## 2020-07-21 NOTE — NURSING NOTE
Drug Administration Record    Prior to injection, verified patient identity using patient's name and date of birth.  Due to injection administration, patient instructed to remain in clinic for 15 minutes  afterwards, and to report any adverse reaction to me immediately.    Drug Name: triamcinolone acetonide(kenalog)  Dose: 3mL of triamcinolone 10mg/mL, 30mg dose  Route administered: ID  NDC #: Kenalog-10 ([id number])  Amount of waste(mL):2mL  Reason for waste: Multi dose vial    LOT #: [id number]  SITE: scalp  : [employer]  EXPIRATION DATE: 1/2022    JER Gonzales

## 2020-10-09 ENCOUNTER — OFFICE VISIT (OUTPATIENT)
Dept: DERMATOLOGY | Facility: CLINIC | Age: 35
End: 2020-10-09
Payer: COMMERCIAL

## 2020-10-09 DIAGNOSIS — L63.9 ALOPECIA AREATA: Primary | ICD-10-CM

## 2020-10-09 LAB
BASOPHILS # BLD AUTO: 0.1 10E9/L (ref 0–0.2)
BASOPHILS NFR BLD AUTO: 0.8 %
DIFFERENTIAL METHOD BLD: ABNORMAL
EOSINOPHIL # BLD AUTO: 0.3 10E9/L (ref 0–0.7)
EOSINOPHIL NFR BLD AUTO: 4.4 %
ERYTHROCYTE [DISTWIDTH] IN BLOOD BY AUTOMATED COUNT: 12.7 % (ref 10–15)
HCT VFR BLD AUTO: 42.5 % (ref 35–47)
HGB BLD-MCNC: 13 G/DL (ref 11.7–15.7)
IMM GRANULOCYTES # BLD: 0 10E9/L (ref 0–0.4)
IMM GRANULOCYTES NFR BLD: 0.1 %
LYMPHOCYTES # BLD AUTO: 2.3 10E9/L (ref 0.8–5.3)
LYMPHOCYTES NFR BLD AUTO: 30.6 %
MCH RBC QN AUTO: 28.6 PG (ref 26.5–33)
MCHC RBC AUTO-ENTMCNC: 30.6 G/DL (ref 31.5–36.5)
MCV RBC AUTO: 93 FL (ref 78–100)
MONOCYTES # BLD AUTO: 0.3 10E9/L (ref 0–1.3)
MONOCYTES NFR BLD AUTO: 4.4 %
NEUTROPHILS # BLD AUTO: 4.5 10E9/L (ref 1.6–8.3)
NEUTROPHILS NFR BLD AUTO: 59.7 %
NRBC # BLD AUTO: 0 10*3/UL
NRBC BLD AUTO-RTO: 0 /100
PLATELET # BLD AUTO: 352 10E9/L (ref 150–450)
RBC # BLD AUTO: 4.55 10E12/L (ref 3.8–5.2)
WBC # BLD AUTO: 7.5 10E9/L (ref 4–11)

## 2020-10-09 PROCEDURE — 96999 UNLISTED SPEC DERM SVC/PX: CPT | Performed by: DERMATOLOGY

## 2020-10-09 PROCEDURE — 99207 PR NO CHARGE LOS: CPT | Performed by: DERMATOLOGY

## 2020-10-09 PROCEDURE — 85025 COMPLETE CBC W/AUTO DIFF WBC: CPT | Performed by: DERMATOLOGY

## 2020-10-09 PROCEDURE — 36415 COLL VENOUS BLD VENIPUNCTURE: CPT | Performed by: DERMATOLOGY

## 2020-10-09 PROCEDURE — 36415 COLL VENOUS BLD VENIPUNCTURE: CPT | Performed by: PATHOLOGY

## 2020-10-09 PROCEDURE — 85025 COMPLETE CBC W/AUTO DIFF WBC: CPT | Performed by: PATHOLOGY

## 2020-10-09 ASSESSMENT — PAIN SCALES - GENERAL: PAINLEVEL: NO PAIN (0)

## 2020-10-09 NOTE — PROCEDURES
"Platelet Rich Plasma Procedure Note:  {[facility]:244466}    Attending Staff Surgeon:  ***    Resident Surgeon:  ***    Nurse: ***     Anesthesia: {Anesthesiaderm:261436}    Diagnosis:***    Interval clinical summary: ***    Interval clinical exam:{Hair exam:272561}    Location: ***    SALT score:    Treatment #: ***    Device/Kit: ***Eclipse PRP HC/EMCYTE/SELPHYL  Lot Number: ***  Expiration Date: ***  Amandeep/Chillder used:  {YES CAPS/NO SMALL:951697::\"YES\",\"No\"}    Procedure:   Name of procedure: Intralesional platelet rich plasma (PRP)    Total mLs of blood collection: ***     Total mL of platelet poor plasma (PPP): ***    Total mL of PRP collected: ***    Total mLs of PRP injected: ***      to [facility] laboratory: ***     for growth factors PPP in mLs: ***     for growth factors PRP (not activated) in mLs: ***     for growth factors PRP (activated) in mLs: ***    Was any part of this sample activated (example selphyl device):***    Was platelet counter use? ***Yes/No   Platelet density in initial blood draw?***   Platelet density for prp injection? ***was *** billion platelets per mL    Was sodium citrate citrate added? ***Yes/No    If, so how much in mL? ***    Pain of procedure rating: ***      Fotofinder photos: {YES-NO  Default Yes:4444::\"Yes\"}    Preperation:   {DERMSURGPREP:548983}    Description of Procedure   Risks of this procedure include but are not limited to swelling, redness, pain, headache, bleeding, bruising, scarring, nerve injury, numbness, and infection.  Possible consequences and complications, and alternative methods of treatment were explained in detail. The patient was informed that the use of device and platelet rich plasma is off-label and not FDA approved. We reviewed the possibility of no improvement, slight improvement or worsening with this treatment.      Needle size: ***  Syringe size (1 or " 3cc): ***  Injection depth (intralesional/follicular stemcell region at a 45 degree angle or below: *** level of dermis and into the fat): ***    The patient denied the following prior to the procedure: pregnancy, breast feeding, blood/clotting disorders.A signed informed operative consent was obtained.    The patient was properly positioned. The area to be treated was defined and confirmed by the patient and physician. Time-out was performed. Site identification was performed The area was prepped. Injections were performed. The scalp was massaged. The patient was observed. After visit verbal and written instructions were provided.     Dr. Adams was immediately available for the entire surgery and was physicially present for the key portions of the procedure.    Clinical Follow-Up: ***    Staff Involved:  Resident/Staff       Misael Torres MD

## 2020-10-09 NOTE — LETTER
10/9/2020       RE: Shea Siddiqui  [address] [facility] [address]     Dear Colleague,    Thank you for referring your patient, Shea Siddiqui, to the [facility] [place] at [facility]. Please see a copy of my visit note below.    [facility] Dermatology Note      Dermatology Problem List:  1. Alopecia areata  - Current treatment: shampoo 0.05% 3-4x weekly  - Prior treatment: derma-smoothe/fs oil 1x weekly, ILK, steroid taper (completed around 7/14/20)  - Patient is seen here and at [facility]  - ILK injections 7/21/20  2. 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 History of contact dermatitis, L ring finger: Suspect ACD 2/2 metal  4. Positive Hep C Ab  - Liver ultrasound normal  - Hep C AB positive, HCV RNA negative  - doing repeat labs in 3-6 months (from May 2020)  - Antibodies positive for 1 of 3 reasons including false positive, spontaneous viral clearance, or acutely infected and not yet generated significant viremia.    Encounter Date: Oct 9, 2020    CC:   Chief Complaint   Patient presents with     Hair Loss     Shea is here today for PRP treatment #1       History of Present Illness:  Ms. Shea Siddiqui is a 35 year old female who presents as a follow-up patient for hair loss 2/2 alopecia areata.     - Last seen in clinic on 7/21/20   - Current tx: clobex shampoo, sporadic use   - Scalp pain: no  - Scalp burning: no   - Scalp itching: no   - Eyebrow or eyelash changes: normal   - Nail changes: none noticed   - Overall course: worsening, now affecting top of scalp which is new     Patient reports feeling well and denies any recent illness/stressors. No other concerns today and in general state of health otherwise.       Past Medical History:   Patient Active Problem List   Diagnosis     Alopecia areata     Autoimmune disease (H)     KP (keratosis pilaris)      Dermatitis, seborrheic     Skin atrophy     History reviewed. No pertinent past medical history.  History reviewed. No pertinent surgical history.    Social History:   reports that she has never smoked. She has never used smokeless tobacco.    Family History:  Family History   Problem Relation Age of Onset     Cancer Paternal Grandmother         skin cancer     Skin Cancer Paternal Grandmother      Skin Cancer Maternal Grandmother        Medications:  Current Outpatient Medications   Medication Sig Dispense Refill     ALBUTEROL IN Inhale into the lungs as needed       clobetasol propionate (CLOBEX) 0.05 % external shampoo Use 3-4 times weekly 60 mL 11     cyclobenzaprine (FLEXERIL) 10 MG tablet        Multiple Vitamins-Minerals (MULTIVITAMIN ADULT PO) Take by mouth daily       ondansetron (ZOFRAN-ODT) 4 MG ODT tab TAKE 1 TABLET BY MOUTH EVERY 8 HOURS AS NEED FOR NAUSEA OR VOMITING. RELEATED TO MIGRAINE HEADACHES       Ondansetron HCl (ZOFRAN PO) Take by mouth as needed for nausea or vomiting       Prenatal MV-Min-Fe Fum-FA-DHA (PRENATAL 1 PO) Take by mouth daily       Probiotic Product (PROBIOTIC-10 PO)        promethazine (PHENERGAN) 25 MG tablet Take by mouth every 6 hours as needed for nausea       ranitidine (ZANTAC) 150 MG capsule Take by mouth 2 times daily       SUMAtriptan (IMITREX) 25 MG tablet Take 25-50 mg by mouth       Vitamin D, Cholecalciferol, 400 units CHEW        clobetasol propionate (OLUX) 0.05 % FOAM Apply a golf ball size of product to affected areas nightly 6 to 7 days of the week (one day off) (Patient not taking: Reported on 4/27/2020) 100 g 3     Fluocinolone Acetonide Scalp (DERMA-SMOOTHE/FS SCALP) 0.01 % OIL oil 2 ml to scalp weekly, leave on for 4 hours or overnight, shampoo after application (Patient not taking: Reported on 4/27/2020) 60 mL 5     predniSONE (DELTASONE) 10 MG tablet Take 3 pills/day for 7 days, then 2/day for 7 days, then 1/day for 7 days, then 0.5/day for 7d.  (Patient not taking: Reported on 4/27/2020) 46 tablet 0     predniSONE (DELTASONE) 10 MG tablet Take 1 tablet (10 mg) by mouth every 48 hours (Patient not taking: Reported on 7/13/2020) 112 tablet 0     predniSONE (DELTASONE) 5 MG tablet 3.5 tabs daily for 1 week, then 3 tabs daily for 1 week,, then 3 tabs alternating with 2 tabs every other day for 1 week, then 3 tabs alternating with 1 tab every other day, then 3 tabs alternating with 1/2 tab every other day for 1 week, then 3 tabs every other day (Patient not taking: Reported on 4/27/2020) 100 tablet 1     predniSONE (DELTASONE) 5 MG tablet Take 4 tablets (20 mg) by mouth daily (Patient not taking: Reported on 4/27/2020) 30 tablet 1     predniSONE (DELTASONE) 5 MG tablet 6 tabs in am for 5 days, then 4 tabs in am for 5 days, then 2 tabs in am for 5 days, then 1 tab daily for 5 days (Patient not taking: Reported on 4/27/2020) 100 tablet 0     triamcinolone (KENALOG) 0.1 % ointment Apply to rash on the left ring finger twice per day until resolution (Patient not taking: Reported on 4/27/2020) 30 g 1      Allergies   Allergen Reactions     Ceclor [Cefaclor]      hives     Erythromycin      hives     Relafen [Nabumetone]      hives     Rocephin [Ceftriaxone]      hives       Review of Systems:  -Constitutional: Generally feeling well today.   -Skin: As above in HPI. No additional skin concerns.    Physical exam:  Vitals: There were no vitals taken for this visit.  GEN: Well developed, well-nourished, in no acute distress, in a pleasant mood.    SKIN:   Focused examination of the hair was performed.  - Eyelashes and eyebrows normal density  - Hair pull test negative (up to 5)   - Large patches of alopecia on occipital and temporal regions; loss circumferentially around the scalp with new patch on vertex of scalp   -No other lesions of concern on areas examined.     Impression/Plan:    1. Alopecia areata. Some increased loss on exam today than previous visit, mildly  more widespread loss today on exam and now affecting top of scalp. Had considered starting ONEIL inhibitor last visit, but found to have positive Hep C Ab (see previous notes). Did more thorough work in addition to liver US. Liver US was normal. Lab evaluation determined that she is Hep C Ab positive and HCV RNA negative. She does not have Hepatitis C, but Ab was positive likely for 1 of 3 reasons including false positive, spontaneous viral clearance, or acutely infected and not yet generated significant viremia. She will be re-testing labs in 3-6 months to distinguish between these possibilities. Finished her prednisone taper a few weeks ago, but this did not help as it has in the past. May consider ONEIL inhibitor in the future pending repeat labs; would need to work with GI to see if ONEIL inhibitor would be a good choice for her, however as of now she may have difficulties with insurance.   - Continue clobetasol 0.5% shampoo 3-4 times a week  - PRP today (see procedure note below), patient will receive total of 3 treatments a month apart     Follow-up in 1 month for second PRP treatment      Patient was staffed by Dr. Yarelis Torres MD   Dermatology Resident       Platelet Rich Plasma Procedure Note:  Cosmetic    Attending Staff Surgeon: Dr. Valery Santoyo     Resident Surgeon: Dr. Carmel Torres     Nurse: Deb     Anesthesia: None    Diagnosis: Alopecia areata     Interval clinical summary:   Ms. Shea Siddiqui is a 35 year old female who presents as a follow-up patient for hair loss 2/2 alopecia areata.   - Last seen in clinic on 7/21/20   - Current tx: clobex shampoo, sporadic use   - Scalp pain: no  - Scalp burning: no   - Scalp itching: no   - Eyebrow or eyelash changes: normal   - Nail changes: none noticed   - Overall course: worsening, now affecting top of scalp which is new   Patient reports feeling well and denies any recent illness/stressors. No other concerns today and in general state  of health otherwise.       Interval clinical exam:  Focused examination of the hair was performed.  - Eyelashes and eyebrows normal density  - Hair pull test negative (up to 5)   - Large patches of alopecia on occipital and temporal regions; loss circumferentially around the scalp with new patch on vertex of scalp   -No other lesions of concern on areas examined.     Location: scalp     SALT score: 66%     Treatment #: 1     Device/Kit: Eclipse PRP HC/EMCYTE/SELPHYL  Lot Number: [id number]  Expiration Date: 21  Amandeep/Chillder used:  No    Procedure:   Name of procedure: Intralesional platelet rich plasma (PRP)    Total mLs of blood collection: 22 ml      Total mL of platelet poor plasma (PPP): 5 ml     Total mL of PRP collected: 8.5 ml     Total mLs of PRP injected: 7 ml       [facility]: 0.5 ml       for growth factors PPP in mLs: 2 ml      for growth factors PRP (not activated) in mLs: 1 ml      for growth factors PRP (activated) in mLs: n/a     Was any part of this sample activated (example selphyl device): no    Was platelet counter use? No    Was sodium citrate citrate added? No     Pain of procedure ratin-5/10 for the top, 3/10 for the back      Fotofinder photos: Yes    Preperation:   Alcohol swab    Description of Procedure   Risks of this procedure include but are not limited to swelling, redness, pain, headache, bleeding, bruising, scarring, nerve injury, numbness, and infection.  Possible consequences and complications, and alternative methods of treatment were explained in detail. The patient was informed that the use of device and platelet rich plasma is off-label and not FDA approved. We reviewed the possibility of no improvement, slight improvement or worsening with this treatment.      Needle size: 30 gauge   Syringe size (1 or 3cc): 3 cc  Injection depth (intralesional/follicular stemcell region at a 45  degree angle or below: level of dermis and into the fat): 45 degree angle at level of stem cell region     The patient denied the following prior to the procedure: pregnancy, breast feeding, blood/clotting disorders. A signed informed operative consent was obtained.    Patient used a squeeze ball and also received massage on posterior scalp. Patient preferred posterior scalp massage.      The patient was properly positioned. The area to be treated was defined and confirmed by the patient and physician. Time-out was performed. Site identification was performed The area was prepped. Injections were performed. The scalp was massaged. The patient was observed. After visit verbal and written instructions were provided.     Dr. Adams was immediately available for the entire surgery and was physicially present for the key portions of the procedure.    Clinical Follow-Up: 1 month for 2nd PRP treatment     Staff Involved:  Resident/Staff       Carmel Torres MD    Patient was seen and examined with the dermatology resident. I agree with the history, review of systems, physical examination, assessments and plan. The procedure was done together. Of note, this is treatment #1 of 3 sponsored with a gift to [facility] from an alopecia areata patient.     Valery Santoyo MD  Professor and  Chair  Department of Dermatology  [facility]

## 2020-10-09 NOTE — PROGRESS NOTES
[facility] Dermatology Note      Dermatology Problem List:  1. Alopecia areata  - Current treatment: shampoo 0.05% 3-4x weekly  - Prior treatment: derma-smoothe/fs oil 1x weekly, ILK, steroid taper (completed around 7/14/20)  - Patient is seen here and at [facility]  - ILK injections 7/21/20  2. 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 History of contact dermatitis, L ring finger: Suspect ACD 2/2 metal  4. Positive Hep C Ab  - Liver ultrasound normal  - Hep C AB positive, HCV RNA negative  - doing repeat labs in 3-6 months (from May 2020)  - Antibodies positive for 1 of 3 reasons including false positive, spontaneous viral clearance, or acutely infected and not yet generated significant viremia.    Encounter Date: Oct 9, 2020    CC:   Chief Complaint   Patient presents with     Hair Loss     Shea is here today for PRP treatment #1       History of Present Illness:  Ms. Shea Siddiqui is a 35 year old female who presents as a follow-up patient for hair loss 2/2 alopecia areata.     - Last seen in clinic on 7/21/20   - Current tx: clobex shampoo, sporadic use   - Scalp pain: no  - Scalp burning: no   - Scalp itching: no   - Eyebrow or eyelash changes: normal   - Nail changes: none noticed   - Overall course: worsening, now affecting top of scalp which is new     Patient reports feeling well and denies any recent illness/stressors. No other concerns today and in general state of health otherwise.       Past Medical History:   Patient Active Problem List   Diagnosis     Alopecia areata     Autoimmune disease (H)     KP (keratosis pilaris)     Dermatitis, seborrheic     Skin atrophy     History reviewed. No pertinent past medical history.  History reviewed. No pertinent surgical history.    Social History:   reports that she has never smoked. She has never used smokeless tobacco.    Family History:  Family History   Problem Relation Age of Onset      Cancer Paternal Grandmother         skin cancer     Skin Cancer Paternal Grandmother      Skin Cancer Maternal Grandmother        Medications:  Current Outpatient Medications   Medication Sig Dispense Refill     ALBUTEROL IN Inhale into the lungs as needed       clobetasol propionate (CLOBEX) 0.05 % external shampoo Use 3-4 times weekly 60 mL 11     cyclobenzaprine (FLEXERIL) 10 MG tablet        Multiple Vitamins-Minerals (MULTIVITAMIN ADULT PO) Take by mouth daily       ondansetron (ZOFRAN-ODT) 4 MG ODT tab TAKE 1 TABLET BY MOUTH EVERY 8 HOURS AS NEED FOR NAUSEA OR VOMITING. RELEATED TO MIGRAINE HEADACHES       Ondansetron HCl (ZOFRAN PO) Take by mouth as needed for nausea or vomiting       Prenatal MV-Min-Fe Fum-FA-DHA (PRENATAL 1 PO) Take by mouth daily       Probiotic Product (PROBIOTIC-10 PO)        promethazine (PHENERGAN) 25 MG tablet Take by mouth every 6 hours as needed for nausea       ranitidine (ZANTAC) 150 MG capsule Take by mouth 2 times daily       SUMAtriptan (IMITREX) 25 MG tablet Take 25-50 mg by mouth       Vitamin D, Cholecalciferol, 400 units CHEW        clobetasol propionate (OLUX) 0.05 % FOAM Apply a golf ball size of product to affected areas nightly 6 to 7 days of the week (one day off) (Patient not taking: Reported on 4/27/2020) 100 g 3     Fluocinolone Acetonide Scalp (DERMA-SMOOTHE/FS SCALP) 0.01 % OIL oil 2 ml to scalp weekly, leave on for 4 hours or overnight, shampoo after application (Patient not taking: Reported on 4/27/2020) 60 mL 5     predniSONE (DELTASONE) 10 MG tablet Take 3 pills/day for 7 days, then 2/day for 7 days, then 1/day for 7 days, then 0.5/day for 7d. (Patient not taking: Reported on 4/27/2020) 46 tablet 0     predniSONE (DELTASONE) 10 MG tablet Take 1 tablet (10 mg) by mouth every 48 hours (Patient not taking: Reported on 7/13/2020) 112 tablet 0     predniSONE (DELTASONE) 5 MG tablet 3.5 tabs daily for 1 week, then 3 tabs daily for 1 week,, then 3 tabs  alternating with 2 tabs every other day for 1 week, then 3 tabs alternating with 1 tab every other day, then 3 tabs alternating with 1/2 tab every other day for 1 week, then 3 tabs every other day (Patient not taking: Reported on 4/27/2020) 100 tablet 1     predniSONE (DELTASONE) 5 MG tablet Take 4 tablets (20 mg) by mouth daily (Patient not taking: Reported on 4/27/2020) 30 tablet 1     predniSONE (DELTASONE) 5 MG tablet 6 tabs in am for 5 days, then 4 tabs in am for 5 days, then 2 tabs in am for 5 days, then 1 tab daily for 5 days (Patient not taking: Reported on 4/27/2020) 100 tablet 0     triamcinolone (KENALOG) 0.1 % ointment Apply to rash on the left ring finger twice per day until resolution (Patient not taking: Reported on 4/27/2020) 30 g 1      Allergies   Allergen Reactions     Ceclor [Cefaclor]      hives     Erythromycin      hives     Relafen [Nabumetone]      hives     Rocephin [Ceftriaxone]      hives       Review of Systems:  -Constitutional: Generally feeling well today.   -Skin: As above in HPI. No additional skin concerns.    Physical exam:  Vitals: There were no vitals taken for this visit.  GEN: Well developed, well-nourished, in no acute distress, in a pleasant mood.    SKIN:   Focused examination of the hair was performed.  - Eyelashes and eyebrows normal density  - Hair pull test negative (up to 5)   - Large patches of alopecia on occipital and temporal regions; loss circumferentially around the scalp with new patch on vertex of scalp   -No other lesions of concern on areas examined.     Impression/Plan:    1. Alopecia areata. Some increased loss on exam today than previous visit, mildly more widespread loss today on exam and now affecting top of scalp. Had considered starting ONEIL inhibitor last visit, but found to have positive Hep C Ab (see previous notes). Did more thorough work in addition to liver US. Liver US was normal. Lab evaluation determined that she is Hep C Ab positive and HCV  RNA negative. She does not have Hepatitis C, but Ab was positive likely for 1 of 3 reasons including false positive, spontaneous viral clearance, or acutely infected and not yet generated significant viremia. She will be re-testing labs in 3-6 months to distinguish between these possibilities. Finished her prednisone taper a few weeks ago, but this did not help as it has in the past. May consider ONEIL inhibitor in the future pending repeat labs; would need to work with GI to see if ONEIL inhibitor would be a good choice for her, however as of now she may have difficulties with insurance.   - Continue clobetasol 0.5% shampoo 3-4 times a week  - PRP today (see procedure note below), patient will receive total of 3 treatments a month apart     Follow-up in 1 month for second PRP treatment      Patient was staffed by Dr. Yarelis Torres MD   Dermatology Resident       Platelet Rich Plasma Procedure Note:  Cosmetic    Attending Staff Surgeon: Dr. Valery Santoyo     Resident Surgeon: Dr. Carmel Torres     Nurse: Deb     Anesthesia: None    Diagnosis: Alopecia areata     Interval clinical summary:   Ms. Shea Sididqui is a 35 year old female who presents as a follow-up patient for hair loss 2/2 alopecia areata.   - Last seen in clinic on 7/21/20   - Current tx: clobex shampoo, sporadic use   - Scalp pain: no  - Scalp burning: no   - Scalp itching: no   - Eyebrow or eyelash changes: normal   - Nail changes: none noticed   - Overall course: worsening, now affecting top of scalp which is new   Patient reports feeling well and denies any recent illness/stressors. No other concerns today and in general state of health otherwise.       Interval clinical exam:  Focused examination of the hair was performed.  - Eyelashes and eyebrows normal density  - Hair pull test negative (up to 5)   - Large patches of alopecia on occipital and temporal regions; loss circumferentially around the scalp with new patch on vertex of  scalp   -No other lesions of concern on areas examined.     Location: scalp     SALT score: 66%     Treatment #: 1     Device/Kit: Eclipse PRP HC/EMCYTE/SELPHYL  Lot Number: [id number]  Expiration Date: 21  Amandeep/Chillder used:  No    Procedure:   Name of procedure: Intralesional platelet rich plasma (PRP)    Total mLs of blood collection: 22 ml      Total mL of platelet poor plasma (PPP): 5 ml     Total mL of PRP collected: 8.5 ml     Total mLs of PRP injected: 7 ml       to [facility] laboratory: 0.5 ml       for growth factors PPP in mLs: 2 ml      for growth factors PRP (not activated) in mLs: 1 ml      for growth factors PRP (activated) in mLs: n/a     Was any part of this sample activated (example selphyl device): no    Was platelet counter use? No    Was sodium citrate citrate added? No     Pain of procedure ratin-5/10 for the top, 3/10 for the back      Fotofinder photos: Yes    Preperation:   Alcohol swab    Description of Procedure   Risks of this procedure include but are not limited to swelling, redness, pain, headache, bleeding, bruising, scarring, nerve injury, numbness, and infection.  Possible consequences and complications, and alternative methods of treatment were explained in detail. The patient was informed that the use of device and platelet rich plasma is off-label and not FDA approved. We reviewed the possibility of no improvement, slight improvement or worsening with this treatment.      Needle size: 30 gauge   Syringe size (1 or 3cc): 3 cc  Injection depth (intralesional/follicular stemcell region at a 45 degree angle or below: level of dermis and into the fat): 45 degree angle at level of stem cell region     The patient denied the following prior to the procedure: pregnancy, breast feeding, blood/clotting disorders. A signed informed operative consent was obtained.    Patient used a squeeze ball and also  received massage on posterior scalp. Patient preferred posterior scalp massage.      The patient was properly positioned. The area to be treated was defined and confirmed by the patient and physician. Time-out was performed. Site identification was performed The area was prepped. Injections were performed. The scalp was massaged. The patient was observed. After visit verbal and written instructions were provided.     Dr. Adams was immediately available for the entire surgery and was physicially present for the key portions of the procedure.    Clinical Follow-Up: 1 month for 2nd PRP treatment     Staff Involved:  Resident/Staff       Carmel Torres MD    Patient was seen and examined with the dermatology resident. I agree with the history, review of systems, physical examination, assessments and plan. The procedure was done together. Of note, this is treatment #1 of 3 sponsored with a gift to [facility] from an alopecia areata patient.     Valery Santoyo MD  Professor and  Chair  Department of Dermatology  [facility]

## 2020-10-09 NOTE — NURSING NOTE
Dermatology Rooming Note    Shea Siddiqui's goals for this visit include:   Chief Complaint   Patient presents with     Hair Loss     Shea is here today for PRP treatment #1     JER Rivero

## 2020-10-09 NOTE — PATIENT INSTRUCTIONS
Platelet Rich Plasma Procedure  I will experience pain, redness, and swelling. I may experience bruising and bleeding.  Risks are headache, excessive bleeding, scarring, nerve injury, numbness, skin lightening or darkening, and infection. I understand my outcome could be any of the following: no improvement, slight improvement. Multiple treatments may be required.  When should I contact the clinic?    Contact the clinic at any time for questions    If you develop pain, bleeding, lightheadedness, dizziness, fevers, please, contact the clinic right away    Bruising should self resolve over the first few weeks    If you note any bleeding, use clean gauze and apply firm pressure to the area for 15 minutes and contact the clinic    Who should I call with questions?       [facility]: [phone]       [facility]: [phone]       For urgent needs outside of business hours call the [facility] at [phone]        and ask for the dermatology resident on call

## 2020-11-13 ENCOUNTER — OFFICE VISIT (OUTPATIENT)
Dept: DERMATOLOGY | Facility: CLINIC | Age: 35
End: 2020-11-13
Payer: COMMERCIAL

## 2020-11-13 DIAGNOSIS — M35.9 AUTOIMMUNE DISEASE (H): ICD-10-CM

## 2020-11-13 DIAGNOSIS — L63.9 ALOPECIA AREATA: ICD-10-CM

## 2020-11-13 DIAGNOSIS — Z51.81 MEDICATION MONITORING ENCOUNTER: Primary | ICD-10-CM

## 2020-11-13 LAB
BASOPHILS # BLD AUTO: 0 10E9/L (ref 0–0.2)
BASOPHILS NFR BLD AUTO: 0.7 %
DIFFERENTIAL METHOD BLD: ABNORMAL
EOSINOPHIL # BLD AUTO: 0.4 10E9/L (ref 0–0.7)
EOSINOPHIL NFR BLD AUTO: 6.3 %
ERYTHROCYTE [DISTWIDTH] IN BLOOD BY AUTOMATED COUNT: 12.2 % (ref 10–15)
HCT VFR BLD AUTO: 42.2 % (ref 35–47)
HGB BLD-MCNC: 13.2 G/DL (ref 11.7–15.7)
IMM GRANULOCYTES # BLD: 0 10E9/L (ref 0–0.4)
IMM GRANULOCYTES NFR BLD: 0.2 %
LYMPHOCYTES # BLD AUTO: 2.2 10E9/L (ref 0.8–5.3)
LYMPHOCYTES NFR BLD AUTO: 36.3 %
MCH RBC QN AUTO: 28.5 PG (ref 26.5–33)
MCHC RBC AUTO-ENTMCNC: 31.3 G/DL (ref 31.5–36.5)
MCV RBC AUTO: 91 FL (ref 78–100)
MONOCYTES # BLD AUTO: 0.3 10E9/L (ref 0–1.3)
MONOCYTES NFR BLD AUTO: 4.4 %
NEUTROPHILS # BLD AUTO: 3.2 10E9/L (ref 1.6–8.3)
NEUTROPHILS NFR BLD AUTO: 52.1 %
NRBC # BLD AUTO: 0 10*3/UL
NRBC BLD AUTO-RTO: 0 /100
PLATELET # BLD AUTO: 303 10E9/L (ref 150–450)
RBC # BLD AUTO: 4.63 10E12/L (ref 3.8–5.2)
WBC # BLD AUTO: 6.2 10E9/L (ref 4–11)

## 2020-11-13 PROCEDURE — 85025 COMPLETE CBC W/AUTO DIFF WBC: CPT | Performed by: DERMATOLOGY

## 2020-11-13 PROCEDURE — 96999 UNLISTED SPEC DERM SVC/PX: CPT | Performed by: DERMATOLOGY

## 2020-11-13 PROCEDURE — 99207 PR NO CHARGE LOS: CPT | Performed by: DERMATOLOGY

## 2020-11-13 PROCEDURE — 85025 COMPLETE CBC W/AUTO DIFF WBC: CPT | Performed by: PATHOLOGY

## 2020-11-13 PROCEDURE — 36415 COLL VENOUS BLD VENIPUNCTURE: CPT | Performed by: DERMATOLOGY

## 2020-11-13 PROCEDURE — 36415 COLL VENOUS BLD VENIPUNCTURE: CPT | Performed by: PATHOLOGY

## 2020-11-13 ASSESSMENT — PAIN SCALES - GENERAL: PAINLEVEL: NO PAIN (0)

## 2020-11-13 NOTE — PATIENT INSTRUCTIONS
Platelet Rich Plasma Procedure  I will experience pain, redness, and swelling. I may experience bruising and bleeding.  Risks are headache, excessive bleeding, scarring, nerve injury, numbness, skin lightening or darkening, and infection. I understand my outcome could be any of the following: no improvement, slight improvement. Multiple treatments may be required.  When should I contact the clinic?    Contact the clinic at any time for questions    If you develop pain, bleeding, lightheadedness, dizziness, fevers, please, contact the clinic right away    Bruising should self resolve over the first few weeks    If you note any bleeding, use clean gauze and apply firm pressure to the area for 15 minutes and contact the clinic    Who should I call with questions?       [facility]: [phone]       [facility]: [phone]       For urgent needs outside of business hours call the [facility] at [phone]        and ask for the dermatology resident on call

## 2020-11-13 NOTE — NURSING NOTE
Chief Complaint   Patient presents with     Derm Problem     Shea is here for a second round of PRP, She states she hasn't noticed anything yet.      Lisa Ann LPN

## 2020-11-13 NOTE — LETTER
11/13/2020       RE: Shea Siddiqui  [address] [facility] [place] [address]     Dear Colleague,    Thank you for referring your patient, Shea Siddiqui, to the [facility] [place] [facility]. Please see a copy of my visit note below.    Platelet Rich Plasma Procedure Note:  Cosmetic    Attending Staff Surgeon: Dr. Santoyo    Medical Student: Bertha Myers MS    Nurse: Deb Biswas    Anesthesia: Massage/Music    Diagnosis: Alopecia Areata, treatment covered by a patient gift to the [facility]    Interval clinical summary:   1. Alopecia areata  - Current treatment: clobex shampoo 0.05% 2-3x weekly, Redkin conditioner and LaBrae lotion   - PRP: 10/09/20, 11/13/20  - Prior treatment: derma-smoothe/fs oil 1x weekly, ILK, steroid taper (completed around 7/14/20)  - Patient is seen here and at [facility]  - ILK injections 7/21/20  2. 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 History of contact dermatitis, L ring finger: Suspect ACD 2/2 metal  4. Positive Hep C Ab  - Liver ultrasound normal  - Hep C AB positive, HCV RNA negative  - doing repeat labs in 3-6 months (from May 2020)  - Antibodies positive for 1 of 3 reasons including false positive, spontaneous viral clearance, or acutely infected and not yet generated significant viremia.    The patient reports no changes to her scalp/hair regimen. She uses clobex shampoo 2-3x weekly, Redkin conditioner, and occasionally LaBrae lotion on her scalp to help with itching under wigs. She states she has not had any itching or burning of her scalp. Since her last appointment (10/9/20) she has not noticed any increase in hair loss, but notes her right eyebrow looks like she lost the lateral part. This has happened before and it grew back normally. She did shave her hair that was getting longer. She denies any other changes or  concerns today.     Interval clinical exam: Diffuse alopecia of entire scalp with patchy hair regrowth especially along vertex and occipital scalp. No perifollicular erythema or scale. No diffuse erythema.   Negative hair pull tests.    Location: Vertex, frontal, and parietal scalp     SALT score: not calculated. See pictures    Treatment #: 2    Device/Kit: Eclipse PRP HC/EMCYTE/SELPHYL  Lot Number: [id number]  Expiration Date: 2022-09-17  Amandeep/Chillder used:  No    Procedure:   Name of procedure: Intralesional platelet rich plasma (PRP)    Total mLs of blood collection: 22    Total mL of platelet poor plasma (PPP): 5    Total mL of PRP collected: 9    Total mLs of PRP injected: 7.5     to [facility] laboratory: 0.5     for growth factors PPP in mLs: 2     for growth factors PRP (not activated) in mLs: 1     for growth factors PRP (activated) in mLs: n/a    Was any part of this sample activated (example selphyl device): no    Was platelet counter use? No   Platelet density in initial blood draw? n/a   Platelet density for prp injection? pending    Was sodium citrate citrate added? No    If, so how much in mL? n/a    Pain of procedure rating:  Anterior: 7/10  Posterior: 0/10    Fotofinder photos: No  HairMetrix photos: Yes  No hair metrix done with PRP #1     Preperation:   None    Description of Procedure   Risks of this procedure include but are not limited to swelling, redness, pain, headache, bleeding, bruising, scarring, nerve injury, numbness, and infection.  Possible consequences and complications, and alternative methods of treatment were explained in detail. The patient was informed that the use of device and platelet rich plasma is off-label and not FDA approved. We reviewed the possibility of no improvement, slight improvement or worsening with this treatment.      Needle size: 30g  Syringe size (1 or 3cc): 3cc  Injection depth:  intralesional/follicular stemcell region at a 45 degree angle or below    The patient denied the following prior to the procedure: pregnancy, breast feeding, blood/clotting disorders. A signed informed operative consent was obtained.    The patient was properly positioned. The area to be treated was defined and confirmed by the patient and physician. Time-out was performed. Site identification was performed The area was prepped. Injections were performed. The scalp was massaged. The patient was observed. After visit verbal and written instructions were provided.     Dr. Santoyo was immediately available for the entire surgery and was physicially present and participated in the entire procedure.    Clinical Follow-Up: 1 month for PRP #3     Staff Involved:  Medical Student/Staff     Bertha Myers MS4/Dr. Santoyo     Staff Physician:  I was present with the medical student who participated in the service and in the documentation of the note. I have verified the history and personally performed the physical exam and medical decision making. I agree with the assessment and plan of care as documented in the note.  The PRP injections were primarily done by me.      Valery Santoyo MD  Professor and Chair  Department of Dermatology  [facility]: Phone: [phone], Fax:[phone]  [facility]: Phone: [phone], Fax: [phone]                Again, thank you for allowing me to participate in the care of your patient.      Sincerely,    Valery Santoyo MD

## 2020-11-13 NOTE — PROGRESS NOTES
Platelet Rich Plasma Procedure Note:  Cosmetic    Attending Staff Surgeon: Dr. Santoyo    Medical Student: Bertha Myers MS    Nurse: Deb Biswas    Anesthesia: Massage/Music    Diagnosis: Alopecia Areata, treatment covered by a patient gift to the [facility]    Interval clinical summary:   1. Alopecia areata  - Current treatment: clobex shampoo 0.05% 2-3x weekly, Redkin conditioner and LaBrae lotion   - PRP: 10/09/20, 11/13/20  - Prior treatment: derma-smoothe/fs oil 1x weekly, ILK, steroid taper (completed around 7/14/20)  - Patient is seen here and at [facility]  - ILK injections 7/21/20  2. 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 History of contact dermatitis, L ring finger: Suspect ACD 2/2 metal  4. Positive Hep C Ab  - Liver ultrasound normal  - Hep C AB positive, HCV RNA negative  - doing repeat labs in 3-6 months (from May 2020)  - Antibodies positive for 1 of 3 reasons including false positive, spontaneous viral clearance, or acutely infected and not yet generated significant viremia.    The patient reports no changes to her scalp/hair regimen. She uses clobex shampoo 2-3x weekly, Redkin conditioner, and occasionally LaBrae lotion on her scalp to help with itching under wigs. She states she has not had any itching or burning of her scalp. Since her last appointment (10/9/20) she has not noticed any increase in hair loss, but notes her right eyebrow looks like she lost the lateral part. This has happened before and it grew back normally. She did shave her hair that was getting longer. She denies any other changes or concerns today.     Interval clinical exam: Diffuse alopecia of entire scalp with patchy hair regrowth especially along vertex and occipital scalp. No perifollicular erythema or scale. No diffuse erythema.   Negative hair pull tests.    Location: Vertex, frontal, and parietal scalp     SALT score: not calculated.  See pictures    Treatment #: 2    Device/Kit: Eclipse PRP HC/EMCYTE/SELPHYL  Lot Number: [id number]  Expiration Date: 2022-09-17  Amandeep/Deborah used:  No    Procedure:   Name of procedure: Intralesional platelet rich plasma (PRP)    Total mLs of blood collection: 22    Total mL of platelet poor plasma (PPP): 5    Total mL of PRP collected: 9    Total mLs of PRP injected: 7.5     to [facility] laboratory: 0.5     for growth factors PPP in mLs: 2     for growth factors PRP (not activated) in mLs: 1     for growth factors PRP (activated) in mLs: n/a    Was any part of this sample activated (example selphyl device): no    Was platelet counter use? No   Platelet density in initial blood draw? n/a   Platelet density for prp injection? pending    Was sodium citrate citrate added? No    If, so how much in mL? n/a    Pain of procedure rating:  Anterior: 7/10  Posterior: 0/10    Fotofinder photos: No  HairMetrix photos: Yes  No hair metrix done with PRP #1     Preperation:   None    Description of Procedure   Risks of this procedure include but are not limited to swelling, redness, pain, headache, bleeding, bruising, scarring, nerve injury, numbness, and infection.  Possible consequences and complications, and alternative methods of treatment were explained in detail. The patient was informed that the use of device and platelet rich plasma is off-label and not FDA approved. We reviewed the possibility of no improvement, slight improvement or worsening with this treatment.      Needle size: 30g  Syringe size (1 or 3cc): 3cc  Injection depth: intralesional/follicular stemcell region at a 45 degree angle or below    The patient denied the following prior to the procedure: pregnancy, breast feeding, blood/clotting disorders. A signed informed operative consent was obtained.    The patient was properly positioned. The area to be treated was defined and  confirmed by the patient and physician. Time-out was performed. Site identification was performed The area was prepped. Injections were performed. The scalp was massaged. The patient was observed. After visit verbal and written instructions were provided.     Dr. Santoyo was immediately available for the entire surgery and was physicially present and participated in the entire procedure.    Clinical Follow-Up: 1 month for PRP #3     Staff Involved:  Medical Student/Staff     Bertha Myers MS4/Dr. Santoyo     Staff Physician:  I was present with the medical student who participated in the service and in the documentation of the note. I have verified the history and personally performed the physical exam and medical decision making. I agree with the assessment and plan of care as documented in the note.  The PRP injections were primarily done by me.      Valery Santoyo MD  Professor and Chair  Department of Dermatology  [facility]: Phone: [phone], Fax:[phone]  [facility]: Phone: [phone], Fax: [phone]

## 2020-11-13 NOTE — LETTER
11/13/2020      RE: Shea Siddiqui  [address] [place] [facility] [address]       Dear Colleague,    Thank you for the opportunity to participate in the care of your patient, Shea Siddiqui, at the [facility] DERMATOLOGY CLINIC [place] at [facility]. Please see a copy of my visit note below.    Platelet Rich Plasma Procedure Note:  Cosmetic    Attending Staff Surgeon: Dr. Santoyo    Medical Student: Bertha Meyrs MS    Nurse: Deb Biswas    Anesthesia: Massage/Music    Diagnosis: Alopecia Areata, treatment covered by a patient gift to the [facility]    Interval clinical summary:   1. Alopecia areata  - Current treatment: clobex shampoo 0.05% 2-3x weekly, Redkin conditioner and LaBrae lotion   - PRP: 10/09/20, 11/13/20  - Prior treatment: derma-smoothe/fs oil 1x weekly, ILK, steroid taper (completed around 7/14/20)  - Patient is seen here and at [facility]  - ILK injections 7/21/20  2. 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 History of contact dermatitis, L ring finger: Suspect ACD 2/2 metal  4. Positive Hep C Ab  - Liver ultrasound normal  - Hep C AB positive, HCV RNA negative  - doing repeat labs in 3-6 months (from May 2020)  - Antibodies positive for 1 of 3 reasons including false positive, spontaneous viral clearance, or acutely infected and not yet generated significant viremia.    The patient reports no changes to her scalp/hair regimen. She uses clobex shampoo 2-3x weekly, Redkin conditioner, and occasionally LaBrae lotion on her scalp to help with itching under wigs. She states she has not had any itching or burning of her scalp. Since her last appointment (10/9/20) she has not noticed any increase in hair loss, but notes her right eyebrow looks like she lost the lateral part. This has happened before and it grew back normally. She did shave her hair that was getting  longer. She denies any other changes or concerns today.     Interval clinical exam: Diffuse alopecia of entire scalp with patchy hair regrowth especially along vertex and occipital scalp. No perifollicular erythema or scale. No diffuse erythema.   Negative hair pull tests.    Location: Vertex, frontal, and parietal scalp     SALT score: not calculated. See pictures    Treatment #: 2    Device/Kit: Eclipse PRP HC/EMCYTE/SELPHYL  Lot Number: [id number]  Expiration Date: 2022-09-17  Amandeep/Chillder used:  No    Procedure:   Name of procedure: Intralesional platelet rich plasma (PRP)    Total mLs of blood collection: 22    Total mL of platelet poor plasma (PPP): 5    Total mL of PRP collected: 9    Total mLs of PRP injected: 7.5     [facility]: 0.5     for growth factors PPP in mLs: 2     for growth factors PRP (not activated) in mLs: 1     for growth factors PRP (activated) in mLs: n/a    Was any part of this sample activated (example selphyl device): no    Was platelet counter use? No   Platelet density in initial blood draw? n/a   Platelet density for prp injection? pending    Was sodium citrate citrate added? No    If, so how much in mL? n/a    Pain of procedure rating:  Anterior: 7/10  Posterior: 0/10    Fotofinder photos: No  HairMetrix photos: Yes  No hair metrix done with PRP #1     Preperation:   None    Description of Procedure   Risks of this procedure include but are not limited to swelling, redness, pain, headache, bleeding, bruising, scarring, nerve injury, numbness, and infection.  Possible consequences and complications, and alternative methods of treatment were explained in detail. The patient was informed that the use of device and platelet rich plasma is off-label and not FDA approved. We reviewed the possibility of no improvement, slight improvement or worsening with this treatment.      Needle size: 30g  Syringe  size (1 or 3cc): 3cc  Injection depth: intralesional/follicular stemcell region at a 45 degree angle or below    The patient denied the following prior to the procedure: pregnancy, breast feeding, blood/clotting disorders. A signed informed operative consent was obtained.    The patient was properly positioned. The area to be treated was defined and confirmed by the patient and physician. Time-out was performed. Site identification was performed The area was prepped. Injections were performed. The scalp was massaged. The patient was observed. After visit verbal and written instructions were provided.     Dr. Santoyo was immediately available for the entire surgery and was physicially present and participated in the entire procedure.    Clinical Follow-Up: 1 month for PRP #3     Staff Involved:  Medical Student/Staff     IDA Fagan/Dr. Santoyo     Staff Physician:  I was present with the medical student who participated in the service and in the documentation of the note. I have verified the history and personally performed the physical exam and medical decision making. I agree with the assessment and plan of care as documented in the note.  The PRP injections were primarily done by me.      Valery Santoyo MD  Professor and Chair  Department of Dermatology  [facility]: Phone: [phone], Fax:[phone]  [facility]: Phone: [phone], Fax: [phone]

## 2021-01-08 ENCOUNTER — OFFICE VISIT (OUTPATIENT)
Dept: DERMATOLOGY | Facility: CLINIC | Age: 36
End: 2021-01-08
Payer: COMMERCIAL

## 2021-01-08 DIAGNOSIS — M35.9 AUTOIMMUNE DISEASE (H): ICD-10-CM

## 2021-01-08 DIAGNOSIS — L65.9 HAIR LOSS: Primary | ICD-10-CM

## 2021-01-08 DIAGNOSIS — L63.9 ALOPECIA AREATA: ICD-10-CM

## 2021-01-08 LAB
BASOPHILS # BLD AUTO: 0.1 10E9/L (ref 0–0.2)
BASOPHILS NFR BLD AUTO: 0.8 %
DIFFERENTIAL METHOD BLD: ABNORMAL
EOSINOPHIL # BLD AUTO: 0.7 10E9/L (ref 0–0.7)
EOSINOPHIL NFR BLD AUTO: 8.8 %
ERYTHROCYTE [DISTWIDTH] IN BLOOD BY AUTOMATED COUNT: 12.4 % (ref 10–15)
HCT VFR BLD AUTO: 40.7 % (ref 35–47)
HGB BLD-MCNC: 12.7 G/DL (ref 11.7–15.7)
IMM GRANULOCYTES # BLD: 0 10E9/L (ref 0–0.4)
IMM GRANULOCYTES NFR BLD: 0.3 %
LYMPHOCYTES # BLD AUTO: 1.9 10E9/L (ref 0.8–5.3)
LYMPHOCYTES NFR BLD AUTO: 24.9 %
MCH RBC QN AUTO: 28.5 PG (ref 26.5–33)
MCHC RBC AUTO-ENTMCNC: 31.2 G/DL (ref 31.5–36.5)
MCV RBC AUTO: 92 FL (ref 78–100)
MONOCYTES # BLD AUTO: 0.3 10E9/L (ref 0–1.3)
MONOCYTES NFR BLD AUTO: 4.5 %
NEUTROPHILS # BLD AUTO: 4.6 10E9/L (ref 1.6–8.3)
NEUTROPHILS NFR BLD AUTO: 60.7 %
NRBC # BLD AUTO: 0 10*3/UL
NRBC BLD AUTO-RTO: 0 /100
PLATELET # BLD AUTO: 293 10E9/L (ref 150–450)
RBC # BLD AUTO: 4.45 10E12/L (ref 3.8–5.2)
WBC # BLD AUTO: 7.6 10E9/L (ref 4–11)

## 2021-01-08 PROCEDURE — 36415 COLL VENOUS BLD VENIPUNCTURE: CPT | Performed by: DERMATOLOGY

## 2021-01-08 PROCEDURE — 96999 UNLISTED SPEC DERM SVC/PX: CPT | Performed by: DERMATOLOGY

## 2021-01-08 PROCEDURE — 36415 COLL VENOUS BLD VENIPUNCTURE: CPT | Performed by: PATHOLOGY

## 2021-01-08 PROCEDURE — 85025 COMPLETE CBC W/AUTO DIFF WBC: CPT | Performed by: DERMATOLOGY

## 2021-01-08 PROCEDURE — 85025 COMPLETE CBC W/AUTO DIFF WBC: CPT | Performed by: PATHOLOGY

## 2021-01-08 ASSESSMENT — PAIN SCALES - GENERAL: PAINLEVEL: NO PAIN (0)

## 2021-01-08 NOTE — PATIENT INSTRUCTIONS
"Please obtain written documentation from GI provider regarding safety of starting Xeljanz (ONEIL inhibitor)    Please try using Rogaine on the eyebrows and scalp      Platelet rich plasma (PRP) for treatment of alopecia     - Platelet rich plasma or \"PRP\" treatment involves taking the patients blood and concentrating the platelets as platelets are known to contain valuable growth factors and possibly stimulate hair growth.     - PRP is not an FDA approved treatment for alopecia. However, some studies have shown improvement in patients with thinning hair.   - PRP is not typically covered by insurance.   - Typical treatment regimens are every 1-2 months for 3-4 months. Then, an assessment is usually performed to determine future treatment times.   - Risks of this procedure include but are not limited to swelling, redness, pain, headache, bleeding, bruising, scarring, nerve injury, numbness, and infection.    - Blood testing is needed prior to treatment  - There is a possibility of no improvement, slight improvement or worsening with this treatment.    - You should not have this procedure if you are pregnant, breast feeding, or if you have a blood/clotting disorders.     Who should I call with questions?  - [facility]: [phone]   - [facility]: [phone]  - For urgent needs outside of business hours call the [facility] at [phone] and ask for the dermatology resident on call                     "

## 2021-01-08 NOTE — LETTER
1/8/2021       RE: Shea Siddiqui  [address] [facility] [address]     Dear Colleague,    Thank you for referring your patient, Shea Siddiqui, to the [facility] DERMATOLOGY CL[facility] [place] at [facility]. Please see a copy of my visit note below.    Platelet Rich Plasma Procedure Note:  Cosmetic/Medical (funded by [employer])     Attending Staff Surgeon: Dr. Santoyo     Resident Surgeon: Dr. Mejia     Nurse: Deb Biswas     Anesthesia: Massage     Diagnosis: Alopecia areata     Interval clinical summary:   # Alopecia areata  - current tx: clobex shampoo 0.05% 2-3x weekly  - shampoos, conditioners: Redkin conditioner and LaBrae lotion  - PRP: 10/09/20, 11/13/20, 1/8/21  - prior tx: fluocinolone oil 1x weekly, ILK, steroid taper (7/14/20)  - [facility]  - ILK injections 7/21/20  # Positive Hep C Ab (incidental with screening labs for Xeljanz)  - Hep C AB positive, HCV RNA negative  - seen by GI 4/2020 and 9/2020, liver ultrasound normal, deemed false-positive  # Hx lichen simplex chronicus vs hypertrophic scar - L achilles region  # Hx contact dermatitis, L ring finger: Suspect ACD 2/2 metal    - no major change, no major worsening or improvement  - last time had a small patch and shaved it down and hasn't noticed it growing back   - no changes to her scalp/hair regimen. She uses clobex shampoo 2-3x weekly, Redkin conditioner, and occasionally LaBrae lotion on her scalp to help with itching under wigs  - itching or burning: no  - right eyebrow loss stable with no increased loss  - shedding stable since last visit   -state of overall health: good  - presence of severe life stress: no major stress, just off quarantine, client at [facility] tested positive for COVID  - vitamins and supplements: started taking probiotic, waiting to start collagen supplement  - menses: regular, no changes  - had question about COVID vaccine being  safe    Interval clinical exam:   - overall percentage of scalp with no hair fibers: 50% (more along the lateral scalp)  - numerous very fine vellus scalp hairs all over the scalp  - many light gray terminal hair fibers on the left side of the scalp     Location: Scalp     Treatment #: 3     Device/Kit: Eclipse PRP HC/EMCYTE/SELPHYL  Lot Number: [id number]  Expiration Date: 2022-09-17  Amandeep/Chillder used:  No     Procedure:   Name of procedure: Intralesional platelet rich plasma (PRP)     Total mLs of blood collection: 22     Total mL of platelet poor plasma (PPP): 5     Total mL of PRP collected: 9 mL   Total mLs of PRP injected: 7.5 mL      to [facility] laboratory: 0.5   for growth factors PPP in mLs: 2 ml   for growth factors PRP (not activated) in mLs: 1 ml   for growth factors PRP (activated) in mLs: N/A  Was any part of this sample activated (example selphyl device):N/A  Was platelet counter use? No  Was sodium citrate citrate added? No    Pain of procedure rating:   Anterior: 7-8/10  Posterior: 6/10     Fotofinder photos: No  HairMetrix photos: No  - obtained with PRP#2, holding for PRP #3 as fibers are small and miniaturized    Preperation:   None     Description of Procedure   Risks of this procedure include but are not limited to swelling, redness, pain, headache, bleeding, bruising, scarring, nerve injury, numbness, and infection.  Possible consequences and complications, and alternative methods of treatment were explained in detail. The patient was informed that the use of device and platelet rich plasma is off-label and not FDA approved. We reviewed the possibility of no improvement, slight improvement or worsening with this treatment.       Needle size: 30  Syringe size (1 or 3cc): 3 cc  Injection depth (intralesional/follicular): stemcell region at a 45 degree angle      The patient denied the following prior to the procedure:  pregnancy, breast feeding, blood/clotting disorders. A signed informed operative consent was obtained.  The patient was properly positioned. The area to be treated was defined and confirmed by the patient and physician. Time-out was performed. Site identification was performed The area was prepped. Injections were performed. The scalp was massaged. The patient was observed. After visit verbal and written instructions were provided.      Clinical Follow-Up: 6-8 weeks clinical follow-up    Kathrine Mejia MD  Dermatology Resident  [facility]    Patient was seen and examined with the dermatology resident. I agree with the history, review of systems, physical examination, assessments and plan. I was present for the entire PRP procedure.    Valery Santoyo MD  Professor and  Chair  Department of Dermatology  [facility]

## 2021-01-08 NOTE — NURSING NOTE
Chief Complaint   Patient presents with     Derm Problem     Shea is here for PRP. She has noticed a few new white hairs.      Lisa Ann LPN

## 2021-01-08 NOTE — PROGRESS NOTES
Platelet Rich Plasma Procedure Note:  Cosmetic/Medical (funded by [employer])     Attending Staff Surgeon: Dr. Santoyo     Resident Surgeon: Dr. Mejia     Nurse: Deb Biswas     Anesthesia: Massage     Diagnosis: Alopecia areata     Interval clinical summary:   # Alopecia areata  - current tx: clobex shampoo 0.05% 2-3x weekly  - shampoos, conditioners: Redkin conditioner and LaBrae lotion  - PRP: 10/09/20, 11/13/20, 1/8/21  - prior tx: fluocinolone oil 1x weekly, ILK, steroid taper (7/14/20)  [facility]  - ILK injections 7/21/20  # Positive Hep C Ab (incidental with screening labs for Xeljanz)  - Hep C AB positive, HCV RNA negative  - seen by GI 4/2020 and 9/2020, liver ultrasound normal, deemed false-positive  # Hx lichen simplex chronicus vs hypertrophic scar - L achilles region  # Hx contact dermatitis, L ring finger: Suspect ACD 2/2 metal    - no major change, no major worsening or improvement  - last time had a small patch and shaved it down and hasn't noticed it growing back   - no changes to her scalp/hair regimen. She uses clobex shampoo 2-3x weekly, Redkin conditioner, and occasionally LaBrae lotion on her scalp to help with itching under wigs  - itching or burning: no  - right eyebrow loss stable with no increased loss  - shedding stable since last visit   -state of overall health: good  - presence of severe life stress: no major stress, just off quarantine, client at [facility] tested positive for COVID  - vitamins and supplements: started taking probiotic, waiting to start collagen supplement  - menses: regular, no changes  - had question about COVID vaccine being safe    Interval clinical exam:   - overall percentage of scalp with no hair fibers: 50% (more along the lateral scalp)  - numerous very fine vellus scalp hairs all over the scalp  - many light gray terminal hair fibers on the left side of the scalp     Location: Scalp     Treatment #: 3     Device/Kit: Eclipse PRP  /EMCYTE/SELPHYL  Lot Number: [id number]  Expiration Date: 2022-09-17  Amandeep/Chillder used:  No     Procedure:   Name of procedure: Intralesional platelet rich plasma (PRP)     Total mLs of blood collection: 22     Total mL of platelet poor plasma (PPP): 5     Total mL of PRP collected: 9 mL   Total mLs of PRP injected: 7.5 mL      to [facility] laboratory: 0.5   for growth factors PPP in mLs: 2 ml   for growth factors PRP (not activated) in mLs: 1 ml   for growth factors PRP (activated) in mLs: N/A  Was any part of this sample activated (example selphyl device):N/A  Was platelet counter use? No  Was sodium citrate citrate added? No    Pain of procedure rating:   Anterior: 7-8/10  Posterior: 6/10     Fotofinder photos: No  HairMetrix photos: No  - obtained with PRP#2, holding for PRP #3 as fibers are small and miniaturized    Preperation:   None     Description of Procedure   Risks of this procedure include but are not limited to swelling, redness, pain, headache, bleeding, bruising, scarring, nerve injury, numbness, and infection.  Possible consequences and complications, and alternative methods of treatment were explained in detail. The patient was informed that the use of device and platelet rich plasma is off-label and not FDA approved. We reviewed the possibility of no improvement, slight improvement or worsening with this treatment.       Needle size: 30  Syringe size (1 or 3cc): 3 cc  Injection depth (intralesional/follicular): stemcell region at a 45 degree angle      The patient denied the following prior to the procedure: pregnancy, breast feeding, blood/clotting disorders. A signed informed operative consent was obtained.  The patient was properly positioned. The area to be treated was defined and confirmed by the patient and physician. Time-out was performed. Site identification was performed The area was prepped. Injections were  performed. The scalp was massaged. The patient was observed. After visit verbal and written instructions were provided.      Clinical Follow-Up: 6-8 weeks clinical follow-up    Kathrine Mejia MD  Dermatology Resident  [facility]    Patient was seen and examined with the dermatology resident. I agree with the history, review of systems, physical examination, assessments and plan. I was present for the entire PRP procedure.    Valery Santoyo MD  Professor and  Chair  Department of Dermatology  [facility]

## 2021-03-08 ENCOUNTER — OFFICE VISIT (OUTPATIENT)
Dept: DERMATOLOGY | Facility: CLINIC | Age: 36
End: 2021-03-08
Payer: COMMERCIAL

## 2021-03-08 VITALS — SYSTOLIC BLOOD PRESSURE: 102 MMHG | DIASTOLIC BLOOD PRESSURE: 65 MMHG | HEART RATE: 56 BPM

## 2021-03-08 DIAGNOSIS — L63.9 ALOPECIA AREATA: ICD-10-CM

## 2021-03-08 DIAGNOSIS — Z51.81 MEDICATION MONITORING ENCOUNTER: ICD-10-CM

## 2021-03-08 DIAGNOSIS — L63.9 ALOPECIA AREATA: Primary | ICD-10-CM

## 2021-03-08 DIAGNOSIS — M35.9 AUTOIMMUNE DISEASE (H): ICD-10-CM

## 2021-03-08 LAB
ALBUMIN SERPL-MCNC: 3.8 G/DL (ref 3.4–5)
ALP SERPL-CCNC: 67 U/L (ref 40–150)
ALT SERPL W P-5'-P-CCNC: 21 U/L (ref 0–50)
ANION GAP SERPL CALCULATED.3IONS-SCNC: 3 MMOL/L (ref 3–14)
AST SERPL W P-5'-P-CCNC: 13 U/L (ref 0–45)
BASOPHILS # BLD AUTO: 0.1 10E9/L (ref 0–0.2)
BASOPHILS NFR BLD AUTO: 0.8 %
BILIRUB SERPL-MCNC: 0.4 MG/DL (ref 0.2–1.3)
BUN SERPL-MCNC: 18 MG/DL (ref 7–30)
CALCIUM SERPL-MCNC: 8.6 MG/DL (ref 8.5–10.1)
CHLORIDE SERPL-SCNC: 108 MMOL/L (ref 94–109)
CO2 SERPL-SCNC: 29 MMOL/L (ref 20–32)
CREAT SERPL-MCNC: 0.91 MG/DL (ref 0.52–1.04)
DIFFERENTIAL METHOD BLD: ABNORMAL
EOSINOPHIL # BLD AUTO: 0.5 10E9/L (ref 0–0.7)
EOSINOPHIL NFR BLD AUTO: 6 %
ERYTHROCYTE [DISTWIDTH] IN BLOOD BY AUTOMATED COUNT: 12.4 % (ref 10–15)
GFR SERPL CREATININE-BSD FRML MDRD: 81 ML/MIN/{1.73_M2}
GLUCOSE SERPL-MCNC: 86 MG/DL (ref 70–99)
HCT VFR BLD AUTO: 40.9 % (ref 35–47)
HGB BLD-MCNC: 12.8 G/DL (ref 11.7–15.7)
IMM GRANULOCYTES # BLD: 0 10E9/L (ref 0–0.4)
IMM GRANULOCYTES NFR BLD: 0.3 %
LYMPHOCYTES # BLD AUTO: 2.8 10E9/L (ref 0.8–5.3)
LYMPHOCYTES NFR BLD AUTO: 35.5 %
MCH RBC QN AUTO: 28.4 PG (ref 26.5–33)
MCHC RBC AUTO-ENTMCNC: 31.3 G/DL (ref 31.5–36.5)
MCV RBC AUTO: 91 FL (ref 78–100)
MONOCYTES # BLD AUTO: 0.4 10E9/L (ref 0–1.3)
MONOCYTES NFR BLD AUTO: 5 %
NEUTROPHILS # BLD AUTO: 4.1 10E9/L (ref 1.6–8.3)
NEUTROPHILS NFR BLD AUTO: 52.4 %
NRBC # BLD AUTO: 0 10*3/UL
NRBC BLD AUTO-RTO: 0 /100
PLATELET # BLD AUTO: 301 10E9/L (ref 150–450)
POTASSIUM SERPL-SCNC: 4 MMOL/L (ref 3.4–5.3)
PROT SERPL-MCNC: 7.4 G/DL (ref 6.8–8.8)
RBC # BLD AUTO: 4.51 10E12/L (ref 3.8–5.2)
SODIUM SERPL-SCNC: 139 MMOL/L (ref 133–144)
WBC # BLD AUTO: 7.9 10E9/L (ref 4–11)

## 2021-03-08 PROCEDURE — 99000 SPECIMEN HANDLING OFFICE-LAB: CPT | Performed by: PATHOLOGY

## 2021-03-08 PROCEDURE — 85025 COMPLETE CBC W/AUTO DIFF WBC: CPT | Performed by: PATHOLOGY

## 2021-03-08 PROCEDURE — 36415 COLL VENOUS BLD VENIPUNCTURE: CPT | Performed by: PATHOLOGY

## 2021-03-08 PROCEDURE — 86803 HEPATITIS C AB TEST: CPT | Mod: 90 | Performed by: PATHOLOGY

## 2021-03-08 PROCEDURE — 86705 HEP B CORE ANTIBODY IGM: CPT | Mod: 90 | Performed by: PATHOLOGY

## 2021-03-08 PROCEDURE — 99213 OFFICE O/P EST LOW 20 MIN: CPT | Mod: 25 | Performed by: DERMATOLOGY

## 2021-03-08 PROCEDURE — 87389 HIV-1 AG W/HIV-1&-2 AB AG IA: CPT | Mod: 90 | Performed by: PATHOLOGY

## 2021-03-08 PROCEDURE — 87340 HEPATITIS B SURFACE AG IA: CPT | Mod: 90 | Performed by: PATHOLOGY

## 2021-03-08 PROCEDURE — 86706 HEP B SURFACE ANTIBODY: CPT | Mod: 90 | Performed by: PATHOLOGY

## 2021-03-08 PROCEDURE — 86704 HEP B CORE ANTIBODY TOTAL: CPT | Mod: 90 | Performed by: PATHOLOGY

## 2021-03-08 PROCEDURE — 11901 INJECT SKIN LESIONS >7: CPT | Mod: GC | Performed by: DERMATOLOGY

## 2021-03-08 PROCEDURE — 80053 COMPREHEN METABOLIC PANEL: CPT | Performed by: PATHOLOGY

## 2021-03-08 ASSESSMENT — PAIN SCALES - GENERAL: PAINLEVEL: NO PAIN (0)

## 2021-03-08 NOTE — LETTER
3/8/2021       RE: Shea Siddiqui  [address] [facility] [address]     Dear Colleague,    Thank you for referring your patient, Shea Siddiqui, to the [facility] DERMATOLOGY CLINIC [place] at [facility]. Please see a copy of my visit note below.    [facility] Dermatology Note   Encounter Date: Mar 8, 2021  Office Visit     Dermatology Problem List:  # Alopecia areata universalis  - current tx: Clobex shampoo 2-3x weekly  - shampoos, conditioners: Redkin conditioner and LaBrae lotion  - PRP: 10/09/20, 11/13/20, 1/8/21  - [facility]  - prior tx: fluocinolone oil 1x weekly, ILK, steroid taper (7/14/20)  - [facility]  - ILK injections 7/21/20, 3/8/21  - Xeljanz baseline labs pending  # Positive Hep C Ab (incidental with screening labs for Xeljanz)  - Hep C AB positive, HCV RNA negative  - seen by GI 4/2020 and 9/2020, liver ultrasound normal, deemed false-positive  # Hx lichen simplex chronicus vs hypertrophic scar - L achilles region  # Hx contact dermatitis, L ring finger: suspect ACD 2/2 metal    ___________________________________________    Assessment & Plan:    # Alopecia areata universalis  * chronic, stable, complicated  - discussed autoimmune etiology and chronic relapsing and remitting nature of the disease  - discussed risks and benefits of various treatments including topical corticosteroids, intralesional triamcinolone, topical immunotherapy, systemic immunosuppressive medications, oral Janus kinase inhibitors  - discussed with patient tofacitinib as an option, discussed risks and benefits including infection, lymphoma, malignancies, bone marrow suppression, liver damage, shingles, myalgias, kidney damage, vaccination reviewed, denies cardiac disease, no history of GI perforation, no history of Insterstitial lung disease; counseled on different response for each patient  - we will not initiate  therapy if abs lymphocyte count <500 cells/mm3, ANC <1000, hemoglobin <9L, baseline heart rate <60   - tofacitinib 5mg BID  - if  tofacitnib started, medication monitoring with CBC with diff, CMP, lipids in 1 month, then 3 months if stable  - clinical trial coordinators to reach out to the patient for possible participation in the [facility] alopecia areata study     Procedures Performed:  Kenalog intralesional injection procedure note  - location(s): scalp, right lateral brow  - strength: 10 mg/ml  - volume: 5 ml  - number of injections: 45  After verbal consent and discussion of risks including but not limited to atrophy, pain, and bruising, time out was performed, patient positioned, area cleaned with alcohol, Kenalog injected into affected sites; patient tolerated procedure well      Follow-up: 6 weeks    Faculty: Dr. Santoyo    Staff Involved:  Resident/Staff    Kathrine Mejia MD  Dermatology Resident  [facility]    Patient was seen and examined with the dermatology resident. I agree with the history, review of systems, physical examination, assessments and plan. I was present for the key portions of the ILK procedure.    Valery Santoyo MD  Professor and  Chair  Department of Dermatology  [facility]    ___________________________________________      CC: Derm Problem (Patient is here today for an 8 week follow up hairloss. )      HPI:  Ms. Shea Siddiqui is a(n) 35 year old female who presents to clinic today as a return patient for alopecia areata  - not much improvement after PRP x 3, not sure if she should continue  - checked with primary doctor about hepatitis C, likely false-positive, cleared for Xeljanz  - frustrated that she still has not heard about when she can get the vaccine as she sees clients every day as a , parents and other family members are in healthcare, another sibling works in media, all have obtained the vaccine  - follow-up with GI regarding positive  hepatitis C, received MyChart communication of KARTHIKEYAN UREÑA which confirmed that the result was very likely false positive, but no mention of Xeljanz  - otherwise feeling well in usual state of health    Labs/Imaging:  Screening labs normal 9/2020    Physical exam:  General: In no acute distress, well-developed, well-nourished  Eyes: Conjunctivae clear  Pulmonary: Breathing comfortably in no distress  CV: Well-perfused, no cyanosis  Extremities: No deformity, no edema  Lymphatic: No lymphadenopathy    Skin: Focused examination of the scalp and face was performed.  - skin type: fair  - scattered light colored hair fibers  - diffuse scalp atrophy, more on left temporal scalp  - eyebrows sparse, more sparse on right lateral                          Medications:  Current Outpatient Medications   Medication     ALBUTEROL IN     clobetasol propionate (CLOBEX) 0.05 % external shampoo     cyclobenzaprine (FLEXERIL) 10 MG tablet     Multiple Vitamins-Minerals (MULTIVITAMIN ADULT PO)     ondansetron (ZOFRAN-ODT) 4 MG ODT tab     Ondansetron HCl (ZOFRAN PO)     Probiotic Product (PROBIOTIC-10 PO)     promethazine (PHENERGAN) 25 MG tablet     ranitidine (ZANTAC) 150 MG capsule     SUMAtriptan (IMITREX) 25 MG tablet     Vitamin D, Cholecalciferol, 400 units CHEW     clobetasol propionate (OLUX) 0.05 % FOAM     Fluocinolone Acetonide Scalp (DERMA-SMOOTHE/FS SCALP) 0.01 % OIL oil     predniSONE (DELTASONE) 10 MG tablet     predniSONE (DELTASONE) 10 MG tablet     predniSONE (DELTASONE) 5 MG tablet     predniSONE (DELTASONE) 5 MG tablet     predniSONE (DELTASONE) 5 MG tablet     Prenatal MV-Min-Fe Fum-FA-DHA (PRENATAL 1 PO)     triamcinolone (KENALOG) 0.1 % ointment     Current Facility-Administered Medications   Medication     triamcinolone acetonide (KENALOG-10) injection 10 mg     triamcinolone acetonide (KENALOG-10) injection 20 mg     triamcinolone acetonide (KENALOG-10) injection 30 mg      Past Medical History:   Patient Active  Problem List   Diagnosis     Alopecia areata     Autoimmune disease (H)     KP (keratosis pilaris)     Dermatitis, seborrheic     Skin atrophy     No past medical history on file.    CC Referred Self, MD  No address on file on close of this encounter.      Again, thank you for allowing me to participate in the care of your patient.      Sincerely,    Valery Santoyo MD

## 2021-03-08 NOTE — NURSING NOTE
Drug Administration Record    Prior to injection, verified patient identity using patient's name and date of birth.  Due to injection administration, patient instructed to remain in clinic for 15 minutes  afterwards, and to report any adverse reaction to me immediately.    Drug Name: triamcinolone acetonide(kenalog)  Dose: 5mL of triamcinolone 10mg/mL, 50mg dose  Route administered: ID  NDC #: Kenalog-10 (6177-5941-36)  Amount of waste(mL):0  Reason for waste: Single use vial    LOT #: [id number]  SITE: scalp  : Expa  EXPIRATION DATE: 6/2022

## 2021-03-08 NOTE — PROGRESS NOTES
[facility] Dermatology Note   Encounter Date: Mar 8, 2021  Office Visit     Dermatology Problem List:  # Alopecia areata universalis  - current tx: Clobex shampoo 2-3x weekly  - shampoos, conditioners: Redkin conditioner and LaBrae lotion  - PRP: 10/09/20, 11/13/20, 1/8/21  - [facility]  - prior tx: fluocinolone oil 1x weekly, ILK, steroid taper (7/14/20)  - seen at [facility] and [facility]  - ILK injections 7/21/20, 3/8/21  - Xeljanz baseline labs pending  # Positive Hep C Ab (incidental with screening labs for Xeljanz)  - Hep C AB positive, HCV RNA negative  - seen by GI 4/2020 and 9/2020, liver ultrasound normal, deemed false-positive  # Hx lichen simplex chronicus vs hypertrophic scar - L achilles region  # Hx contact dermatitis, L ring finger: suspect ACD 2/2 metal    ___________________________________________    Assessment & Plan:    # Alopecia areata universalis  * chronic, stable, complicated  - discussed autoimmune etiology and chronic relapsing and remitting nature of the disease  - discussed risks and benefits of various treatments including topical corticosteroids, intralesional triamcinolone, topical immunotherapy, systemic immunosuppressive medications, oral Janus kinase inhibitors  - discussed with patient tofacitinib as an option, discussed risks and benefits including infection, lymphoma, malignancies, bone marrow suppression, liver damage, shingles, myalgias, kidney damage, vaccination reviewed, denies cardiac disease, no history of GI perforation, no history of Insterstitial lung disease; counseled on different response for each patient  - we will not initiate therapy if abs lymphocyte count <500 cells/mm3, ANC <1000, hemoglobin <9L, baseline heart rate <60   - tofacitinib 5mg BID  - if  tofacitnib started, medication monitoring with CBC with diff, CMP, lipids in 1 month, then 3 months if stable  - clinical trial coordinators to reach out to the patient for possible  participation in the [facility] alopecia areata study     Procedures Performed:  Kenalog intralesional injection procedure note  - location(s): scalp, right lateral brow  - strength: 10 mg/ml  - volume: 5 ml  - number of injections: 45  After verbal consent and discussion of risks including but not limited to atrophy, pain, and bruising, time out was performed, patient positioned, area cleaned with alcohol, Kenalog injected into affected sites; patient tolerated procedure well      Follow-up: 6 weeks    Faculty: Dr. Santoyo    Staff Involved:  Resident/Staff    Kathrine Mejia MD  Dermatology Resident  [facility]    Patient was seen and examined with the dermatology resident. I agree with the history, review of systems, physical examination, assessments and plan. I was present for the key portions of the ILK procedure.    Valery Santoyo MD  Professor and  Chair  Department of Dermatology  [facility]    ___________________________________________      CC: Derm Problem (Patient is here today for an 8 week follow up hairloss. )      HPI:  Ms. Shea Siddiqui is a(n) 35 year old female who presents to clinic today as a return patient for alopecia areata  - not much improvement after PRP x 3, not sure if she should continue  - checked with primary doctor about hepatitis C, likely false-positive, cleared for Xeljanz  - frustrated that she still has not heard about when she can get the vaccine as she sees clients every day as a , parents and other family members are in healthcare, another sibling works in media, all have obtained the vaccine  - follow-up with GI regarding positive hepatitis C, received MyChart communication of KARTHIKEYAN UREÑA which confirmed that the result was very likely false positive, but no mention of Xeljanz  - otherwise feeling well in usual state of health    Labs/Imaging:  Screening labs normal 9/2020    Physical exam:  General: In no acute distress, well-developed,  well-nourished  Eyes: Conjunctivae clear  Pulmonary: Breathing comfortably in no distress  CV: Well-perfused, no cyanosis  Extremities: No deformity, no edema  Lymphatic: No lymphadenopathy    Skin: Focused examination of the scalp and face was performed.  - skin type: fair  - scattered light colored hair fibers  - diffuse scalp atrophy, more on left temporal scalp  - eyebrows sparse, more sparse on right lateral                          Medications:  Current Outpatient Medications   Medication     ALBUTEROL IN     clobetasol propionate (CLOBEX) 0.05 % external shampoo     cyclobenzaprine (FLEXERIL) 10 MG tablet     Multiple Vitamins-Minerals (MULTIVITAMIN ADULT PO)     ondansetron (ZOFRAN-ODT) 4 MG ODT tab     Ondansetron HCl (ZOFRAN PO)     Probiotic Product (PROBIOTIC-10 PO)     promethazine (PHENERGAN) 25 MG tablet     ranitidine (ZANTAC) 150 MG capsule     SUMAtriptan (IMITREX) 25 MG tablet     Vitamin D, Cholecalciferol, 400 units CHEW     clobetasol propionate (OLUX) 0.05 % FOAM     Fluocinolone Acetonide Scalp (DERMA-SMOOTHE/FS SCALP) 0.01 % OIL oil     predniSONE (DELTASONE) 10 MG tablet     predniSONE (DELTASONE) 10 MG tablet     predniSONE (DELTASONE) 5 MG tablet     predniSONE (DELTASONE) 5 MG tablet     predniSONE (DELTASONE) 5 MG tablet     Prenatal MV-Min-Fe Fum-FA-DHA (PRENATAL 1 PO)     triamcinolone (KENALOG) 0.1 % ointment     Current Facility-Administered Medications   Medication     triamcinolone acetonide (KENALOG-10) injection 10 mg     triamcinolone acetonide (KENALOG-10) injection 20 mg     triamcinolone acetonide (KENALOG-10) injection 30 mg      Past Medical History:   Patient Active Problem List   Diagnosis     Alopecia areata     Autoimmune disease (H)     KP (keratosis pilaris)     Dermatitis, seborrheic     Skin atrophy     No past medical history on file.    CC Referred Self, MD  No address on file on close of this encounter.

## 2021-03-08 NOTE — NURSING NOTE
Chief Complaint   Patient presents with     Derm Problem     Patient is here today for an 8 week follow up hairloss.      Lisa GHOSH CMA

## 2021-03-09 LAB — HIV 1+2 AB+HIV1 P24 AG SERPL QL IA: NONREACTIVE

## 2021-03-10 ENCOUNTER — TELEPHONE (OUTPATIENT)
Dept: DERMATOLOGY | Facility: CLINIC | Age: 36
End: 2021-03-10

## 2021-03-10 LAB
HBV CORE AB SERPL QL IA: NONREACTIVE
HBV CORE IGM SERPL QL IA: NONREACTIVE
HBV SURFACE AB SERPL IA-ACNC: 10.05 M[IU]/ML
HBV SURFACE AG SERPL QL IA: NONREACTIVE
HCV AB SERPL QL IA: REACTIVE

## 2021-03-10 NOTE — TELEPHONE ENCOUNTER
PA Initiation    Medication: Xeljanz PA Initiated  Insurance Company: [employer] - Phone [phone] Fax [phone]  Pharmacy Filling the Rx: [facility] - [address] [place] [address]  Filling Pharmacy Phone:    Filling Pharmacy Fax:    Start Date: 3/10/2021    PA will be denied, please provide rationale for use of Xeljanz for off label diagnosis and non-formulary product (they state humira and enbrel are the preferred, would need specific reasoning why both of these would be contraindicated as well as reason for Xeljanz off label use).

## 2021-03-12 NOTE — TELEPHONE ENCOUNTER
PRIOR AUTHORIZATION DENIED    Medication: Xeljanz PA Denied    Denial Date: 3/10/2021    Denial Rational: Off label and non formulary product    Appeal Information: Please provide letter of medical necessity for an appeal--forwarding to nurses and cc'angelique Doshi (off label diagnosis as well as non formulary product, need LMN from provider or rationale for appeal to include in letter)

## 2021-03-25 NOTE — TELEPHONE ENCOUNTER
Following up on this denial. Would the provider like to appeal the decision? Please let me know.    Thank you,   Patricia

## 2021-04-24 ENCOUNTER — HEALTH MAINTENANCE LETTER (OUTPATIENT)
Age: 36
End: 2021-04-24

## 2021-05-09 DIAGNOSIS — Z00.6 RESEARCH SUBJECT: Primary | ICD-10-CM

## 2021-10-04 ENCOUNTER — HEALTH MAINTENANCE LETTER (OUTPATIENT)
Age: 36
End: 2021-10-04

## 2021-10-23 DIAGNOSIS — L63.9 ALOPECIA AREATA: Primary | ICD-10-CM

## 2021-10-23 PROCEDURE — 99207 PR NO CHARGE LOS: CPT | Performed by: DERMATOLOGY

## 2022-05-15 ENCOUNTER — HEALTH MAINTENANCE LETTER (OUTPATIENT)
Age: 37
End: 2022-05-15

## 2022-09-11 ENCOUNTER — HEALTH MAINTENANCE LETTER (OUTPATIENT)
Age: 37
End: 2022-09-11

## 2023-06-03 ENCOUNTER — HEALTH MAINTENANCE LETTER (OUTPATIENT)
Age: 38
End: 2023-06-03

## 2023-07-10 ENCOUNTER — TELEPHONE (OUTPATIENT)
Dept: DERMATOLOGY | Facility: CLINIC | Age: 38
End: 2023-07-10

## 2023-07-10 NOTE — TELEPHONE ENCOUNTER
----- Message from Patricia Gutierrez sent at 6/28/2023 10:05 AM CDT -----  Regarding: Scheduling for cosmetic appointment  Dr. Yarelis Gloria and I saw a patient this morning who is in one of our alopecia areata clinical trials.     The patient reported melasma and hyperpigmentation on the upper lip. Since she travels ~2 hours to get to the [facility], Dr. Santoyo suggested she try to see Dr. Santos when she comes in for her next appointment (Wednesday, July 26th).    Would you be able to fit this patient into Dr. Santos's schedule for a cosmetic consult on July 26th?     Thanks,   Patricia

## 2023-07-10 NOTE — TELEPHONE ENCOUNTER
Spoke with Shea who put a message into [facility] regarding time of appointment. Will get back to writer so we can coincide times of appointments.

## 2023-09-20 NOTE — PROGRESS NOTES
[facility] Dermatology Note  Encounter Date: Sep 27, 2023  Office Visit     Dermatology Problem List:  1. Alopecia areata universalis  - Clobex shampoo 2-3x weekly  - shampoos, conditioners: Redkin conditioner and LaBrae lotion  - PRP: 10/09/20, 11/13/20, 1/8/21  - [facility]  - prior tx: fluocinolone oil 1x weekly, ILK, steroid taper (7/14/20)  - [facility]  - ILK injections 7/21/20, 3/8/21  - Xeljanz baseline labs pending  2. Positive Hep C Ab (incidental with screening labs for Xeljanz)  - Hep C AB positive, HCV RNA negative  - seen by GI 4/2020 and 9/2020, liver ultrasound normal, deemed false-positive  3. Hx lichen simplex chronicus vs hypertrophic scar - L achilles region  4. Hx contact dermatitis, L ring finger: suspect ACD 2/2 metal     ___________________________________________     Assessment & Plan:    # Melasma versus post inflammatory hyperpigmentation, face and lips.   - Start hydroquinone 4% BID for 3 months.Reviewed irritation and   FDA statement  - Recommended pt stop waxing face and instead use derma planning razors for facial hair removal.   -sun protection  -bright girl or skinceuticals fusion  -future: skin discoloration defense        Procedures Performed:   NA    Follow-up: 3 month(s) in-person, or earlier for new or changing lesions    Staff and Scribe:     Scribe Disclosure:   I, Abdirashid Kaba, am serving as a scribe to document services personally performed by this physician, Dr. Page Santos, based on data collection and the provider's statements to me.     Scribe Disclosure:   I, Mya Gerber, am serving as a scribe to document services personally performed by Page Santos MD based on data collection and the provider's statements to me.       Provider Disclosure:   The documentation recorded by the scribe accurately reflects the services I personally performed and the decisions made by me.    Page Santos MD    Department of  Dermatology  [facility]: Phone: [phone], Fax:[phone]  [facility]: Phone: [phone], Fax: [phone]   ____________________________________________    CC: Derm Problem (Shea is here for melasma on her face. She has previously tried a cream she order online that did not work. )    HPI:  Ms. Shea Siddiqui is a(n) 38 year old female who presents today as a return patient for melasma on her face. She reports melasma started in spring 2023, but worsened summer 2023. Pt has been involved in a study for an oral ONEIL inhibitor for the past 2 years. She had no hair when the study started, and has no expected end date for study participation.  She is currently on low-dose birth control pill, which she has been on since starting the JAK inhibitor study.    Last seen in 2021 by Dr. Santoyo for alopecia areata universalis.    Patient is otherwise feeling well, without additional skin concerns.    Labs Reviewed:  N/A    Physical Exam:  Vitals: There were no vitals taken for this visit.  SKIN: Sun-exposed skin, which includes the head/face, neck, both arms, digits, and/or nails was examined.   - Hyperpigmentation and tan skin on upper cutaneous lip.  - Scattered brown macules on sun exposed areas of the face.     - No other lesions of concern on areas examined.     Medications:  Current Outpatient Medications   Medication    ALBUTEROL IN    norethindrone (MICRONOR) 0.35 MG tablet    nortriptyline (PAMELOR) 10 MG capsule    Ondansetron HCl (ZOFRAN PO)    study - CPT-543 vs. placebo, IDS# 5796, 4 mg, 8 mg, 12 mg or placebo tablet    study - CTP-543 vs. placebo, IDS# 5687, 8 mg, 12 mg, or placebo tablet    SUMAtriptan (IMITREX) 25 MG tablet    clobetasol propionate (CLOBEX) 0.05 % external shampoo    clobetasol propionate (OLUX) 0.05 % FOAM    cyclobenzaprine (FLEXERIL) 10 MG tablet    Fluocinolone  Acetonide Scalp (DERMA-SMOOTHE/FS SCALP) 0.01 % OIL oil    Multiple Vitamins-Minerals (MULTIVITAMIN ADULT PO)    ondansetron (ZOFRAN-ODT) 4 MG ODT tab    predniSONE (DELTASONE) 10 MG tablet    predniSONE (DELTASONE) 10 MG tablet    predniSONE (DELTASONE) 5 MG tablet    predniSONE (DELTASONE) 5 MG tablet    predniSONE (DELTASONE) 5 MG tablet    Prenatal MV-Min-Fe Fum-FA-DHA (PRENATAL 1 PO)    Probiotic Product (PROBIOTIC-10 PO)    promethazine (PHENERGAN) 25 MG tablet    ranitidine (ZANTAC) 150 MG capsule    tofacitinib (XELJANZ) 5 MG tablet    triamcinolone (KENALOG) 0.1 % ointment    Vitamin D, Cholecalciferol, 400 units CHEW     Current Facility-Administered Medications   Medication    triamcinolone acetonide (KENALOG-10) injection 10 mg    triamcinolone acetonide (KENALOG-10) injection 20 mg    triamcinolone acetonide (KENALOG-10) injection 30 mg      Past Medical History:   Patient Active Problem List   Diagnosis    Alopecia areata    Autoimmune disease (H)    KP (keratosis pilaris)    Dermatitis, seborrheic    Skin atrophy     No past medical history on file.     CC No referring provider defined for this encounter. on close of this encounter.

## 2023-09-27 ENCOUNTER — OFFICE VISIT (OUTPATIENT)
Dept: DERMATOLOGY | Facility: CLINIC | Age: 38
End: 2023-09-27
Payer: COMMERCIAL

## 2023-09-27 DIAGNOSIS — L81.1 MELASMA: Primary | ICD-10-CM

## 2023-09-27 PROCEDURE — 99214 OFFICE O/P EST MOD 30 MIN: CPT | Performed by: DERMATOLOGY

## 2023-09-27 RX ORDER — NORTRIPTYLINE HCL 10 MG
20 CAPSULE ORAL
COMMUNITY
Start: 2023-09-01

## 2023-09-27 RX ORDER — ACETAMINOPHEN AND CODEINE PHOSPHATE 120; 12 MG/5ML; MG/5ML
0.35 SOLUTION ORAL DAILY
COMMUNITY

## 2023-09-27 RX ORDER — HYDROQUINONE 40 MG/G
CREAM TOPICAL
Qty: 30 G | Refills: 1 | Status: SHIPPED | OUTPATIENT
Start: 2023-09-27

## 2023-09-27 ASSESSMENT — PAIN SCALES - GENERAL: PAINLEVEL: NO PAIN (0)

## 2023-09-27 NOTE — PATIENT INSTRUCTIONS
"Hydroquinone is a cream that lightens skin color:   -stop this medication for a few days if you have irritation, then restart when the irritation resolves. Call the clinic if the irritation does not resolve within a few days or if the reaction is severe   -Use this medication for up to 3 months at a time.   -This medication should be used with sunscreen, as the sun can darken the skin  -Stop tretinoin, tazorac (tazarotene), differin (adapalene), and any retinols when you are using hydroquinone  -Do not use this product in combination with resorcinol containing skin products  -Do not use this product if you are pregnant or breastfeeding.  -Using this cream when your skin is irritated or using it too long can result in darkening of the skin         Hydroquinone: Patient drug information  Access [employer] for additional drug information, tools, and databases.  Copyright 3594-3578 [employer] All rights reserved.  Contributor Disclosures    (For additional information see \"Hydroquinone: Drug information\")    You must carefully read the \"Consumer Information Use and Disclaimer\" below in order to understand and correctly use this information.    Brand Names: US  AMBI Fade [OTC] [DSC]; Amanda; EpiQuin Micro [DSC]; Esoterica Daytime [OTC] [DSC]; Esoterica Facial [OTC]; Esoterica Fade Nighttime [OTC] [DSC]; Esoterica Sensitive Skin [OTC]; Hydroquinone Time Release [DSC]; Melpaque HP; NeoStrata HQ Skin Lightening [OTC] [DSC]; Remergent HQ [DSC]; TL Hydroquinone [DSC]  Brand Names: Arianna  Corrector 4 [DSC]; Drula Fade Superforte Medicate [DSC]; Erfa Hydroquinone; Lustra-AF [DSC]; Nuderm Sunfader [DSC]; Seequin 4 IDs [DSC]    What is this drug used for?   It is used to lighten the skin where there are changes in color.  What do I need to tell my doctor BEFORE I take this drug?   If you are allergic to this drug; any part of this drug; or any other drugs, foods, or substances. Tell your doctor about the allergy and " what signs you had.  This drug may interact with other drugs or health problems.  Tell your doctor and pharmacist about all of your drugs (prescription or OTC, natural products, vitamins) and health problems. You must check to make sure that it is safe for you to take this drug with all of your drugs and health problems. Do not start, stop, or change the dose of any drug without checking with your doctor.  What are some things I need to know or do while I take this drug?   Tell all of your health care providers that you take this drug. This includes your doctors, nurses, pharmacists, and dentists.   After stopping this drug, some of the color change may come back.   If you have a sulfite allergy, talk with your doctor.   This drug may cause harm if swallowed. If this drug is swallowed, call a doctor or poison control center right away.   Avoid sun, sunlamps, and tanning beds. Use sunscreen and wear clothing and eyewear that protects you from the sun.   Tell your doctor if you are pregnant, plan on getting pregnant, or are breast-feeding. You will need to talk about the benefits and risks to you and the baby.  What are some side effects that I need to call my doctor about right away?  WARNING/CAUTION: Even though it may be rare, some people may have very bad and sometimes deadly side effects when taking a drug. Tell your doctor or get medical help right away if you have any of the following signs or symptoms that may be related to a very bad side effect:   Signs of an allergic reaction, like rash; hives; itching; red, swollen, blistered, or peeling skin with or without fever; wheezing; tightness in the chest or throat; trouble breathing, swallowing, or talking; unusual hoarseness; or swelling of the mouth, face, lips, tongue, or throat.   Change in color of skin to blue-black.   Blisters or sores.      What are some other side effects of this drug?  All drugs may cause side effects. However, many people have no side  effects or only have minor side effects. Call your doctor or get medical help if any of these side effects or any other side effects bother you or do not go away:   Dry skin.   Stinging.   Redness.   Skin irritation.  These are not all of the side effects that may occur. If you have questions about side effects, call your doctor. Call your doctor for medical advice about side effects.  You may report side effects to your national health agency.  How is this drug best taken?  Use this drug as ordered by your doctor. Read all information given to you. Follow all instructions closely.   Do not take this drug by mouth. Use on your skin only. Keep out of your mouth, nose, and eyes (may burn).   Wash your hands before and after use. If your hand is the treated area, do not wash your hand after use.   Clean affected part before use. Make sure to dry well.   Put a thin layer on the affected skin and rub in gently.   Practice good skin care and avoid the sun.   Do not use coverings (bandages, dressings, make-up) unless told to do so by the doctor.   Do not use on irritated skin.      What do I do if I miss a dose?   Put on a missed dose as soon as you think about it.   If it is close to the time for your next dose, skip the missed dose and go back to your normal time.   Do not put on 2 doses at the same time or extra doses.  How do I store and/or throw out this drug?   Store at room temperature in a dry place. Do not store in a bathroom.   Keep all drugs in a safe place. Keep all drugs out of the reach of children and pets.   Throw away unused or  drugs. Do not flush down a toilet or pour down a drain unless you are told to do so. Check with your pharmacist if you have questions about the best way to throw out drugs. There may be drug take-back programs in your area.      General drug facts   If your symptoms or health problems do not get better or if they become worse, call your doctor.   Do not share your drugs with  others and do not take anyone else's drugs.   Some drugs may have another patient information leaflet. If you have any questions about this drug, please talk with your doctor, nurse, pharmacist, or other health care provider.   If you think there has been an overdose, call your poison control center or get medical care right away. Be ready to tell or show what was taken, how much, and when it happened.  Last Reviewed Date  2020-09-21      Consumer Information Use and Disclaimer  This generalized information is a limited summary of diagnosis, treatment, and/or medication information. It is not meant to be comprehensive and should be used as a tool to help the user understand and/or assess potential diagnostic and treatment options. It does NOT include all information about conditions, treatments, medications, side effects, or risks that may apply to a specific patient. It is not intended to be medical advice or a substitute for the medical advice, diagnosis, or treatment of a health care provider based on the health care provider's examination and assessment of a patient's specific and unique circumstances. Patients must speak with a health care provider for complete information about their health, medical questions, and treatment options, including any risks or benefits regarding use of medications. This information does not endorse any treatments or medications as safe, effective, or approved for treating a specific patient. UpToDate, Inc. and its affiliates disclaim any warranty or liability relating to this information or the use thereof. The use of this information is governed by the Terms of Use, available at https://www.[employer].com/en/know/clinical-effectiveness-terms.    2022 UpToDate, Inc. and its affiliates and/or licensors. All rights reserved.

## 2023-09-27 NOTE — LETTER
9/27/2023       RE: Shea Siddiqui  [address] [facility] [address]     Dear Colleague,    Thank you for referring your patient, Shea Siddiqui, to the [facility] [place] at [facility]. Please see a copy of my visit note below.    [facility] Dermatology Note  Encounter Date: Sep 27, 2023  Office Visit     Dermatology Problem List:  1. Alopecia areata universalis  - Clobex shampoo 2-3x weekly  - shampoos, conditioners: Redkin conditioner and LaBrae lotion  - PRP: 10/09/20, 11/13/20, 1/8/21  - [facility] philippe  - prior tx: fluocinolone oil 1x weekly, ILK, steroid taper (7/14/20)  - [facility]  - ILK injections 7/21/20, 3/8/21  - Xeljanz baseline labs pending  2. Positive Hep C Ab (incidental with screening labs for Xeljanz)  - Hep C AB positive, HCV RNA negative  - seen by GI 4/2020 and 9/2020, liver ultrasound normal, deemed false-positive  3. Hx lichen simplex chronicus vs hypertrophic scar - L achilles region  4. Hx contact dermatitis, L ring finger: suspect ACD 2/2 metal     ___________________________________________     Assessment & Plan:    # Melasma versus post inflammatory hyperpigmentation, face and lips.   - Start hydroquinone 4% BID for 3 months.Reviewed irritation and   FDA statement  - Recommended pt stop waxing face and instead use derma planning razors for facial hair removal.   -sun protection  -bright girl or skinceuticals fusion  -future: skin discoloration defense        Procedures Performed:   NA    Follow-up: 3 month(s) in-person, or earlier for new or changing lesions    Staff and Scribe:     Scribe Disclosure:   IAbdirashid, am serving as a scribe to document services personally performed by this physician, Dr. Page Santos, based on data collection and the provider's statements to me.     Scribe Disclosure:   IMya, am serving as a scribe to document  services personally performed by Page Santos MD based on data collection and the provider's statements to me.       Provider Disclosure:   The documentation recorded by the scribe accurately reflects the services I personally performed and the decisions made by me.    Page Santos MD    Department of Dermatology  [facility]: Phone: [phone], Fax:[phone]  [facility]: Phone: [phone], Fax: [phone]   ____________________________________________    CC: Derm Problem (Shea is here for melasma on her face. She has previously tried a cream she order online that did not work. )    HPI:  Ms. Shea Siddiqui is a(n) 38 year old female who presents today as a return patient for melasma on her face. She reports melasma started in spring 2023, but worsened summer 2023. Pt has been involved in a study for an oral ONEIL inhibitor for the past 2 years. She had no hair when the study started, and has no expected end date for study participation.  She is currently on low-dose birth control pill, which she has been on since starting the JAK inhibitor study.    Last seen in 2021 by Dr. Santoyo for alopecia areata universalis.    Patient is otherwise feeling well, without additional skin concerns.    Labs Reviewed:  N/A    Physical Exam:  Vitals: There were no vitals taken for this visit.  SKIN: Sun-exposed skin, which includes the head/face, neck, both arms, digits, and/or nails was examined.   - Hyperpigmentation and tan skin on upper cutaneous lip.  - Scattered brown macules on sun exposed areas of the face.     - No other lesions of concern on areas examined.     Medications:  Current Outpatient Medications   Medication    ALBUTEROL IN    norethindrone (MICRONOR) 0.35 MG tablet    nortriptyline (PAMELOR) 10 MG capsule    Ondansetron HCl (ZOFRAN PO)    study - CPT-543 vs. placebo,  IDS# 5796, 4 mg, 8 mg, 12 mg or placebo tablet    study - CTP-543 vs. placebo, IDS# 5687, 8 mg, 12 mg, or placebo tablet    SUMAtriptan (IMITREX) 25 MG tablet    clobetasol propionate (CLOBEX) 0.05 % external shampoo    clobetasol propionate (OLUX) 0.05 % FOAM    cyclobenzaprine (FLEXERIL) 10 MG tablet    Fluocinolone Acetonide Scalp (DERMA-SMOOTHE/FS SCALP) 0.01 % OIL oil    Multiple Vitamins-Minerals (MULTIVITAMIN ADULT PO)    ondansetron (ZOFRAN-ODT) 4 MG ODT tab    predniSONE (DELTASONE) 10 MG tablet    predniSONE (DELTASONE) 10 MG tablet    predniSONE (DELTASONE) 5 MG tablet    predniSONE (DELTASONE) 5 MG tablet    predniSONE (DELTASONE) 5 MG tablet    Prenatal MV-Min-Fe Fum-FA-DHA (PRENATAL 1 PO)    Probiotic Product (PROBIOTIC-10 PO)    promethazine (PHENERGAN) 25 MG tablet    ranitidine (ZANTAC) 150 MG capsule    tofacitinib (XELJANZ) 5 MG tablet    triamcinolone (KENALOG) 0.1 % ointment    Vitamin D, Cholecalciferol, 400 units CHEW     Current Facility-Administered Medications   Medication    triamcinolone acetonide (KENALOG-10) injection 10 mg    triamcinolone acetonide (KENALOG-10) injection 20 mg    triamcinolone acetonide (KENALOG-10) injection 30 mg      Past Medical History:   Patient Active Problem List   Diagnosis    Alopecia areata    Autoimmune disease (H)    KP (keratosis pilaris)    Dermatitis, seborrheic    Skin atrophy     No past medical history on file.     CC No referring provider defined for this encounter. on close of this encounter.

## 2023-09-27 NOTE — NURSING NOTE
Dermatology Rooming Note    Shea Siddiqui's goals for this visit include:   Chief Complaint   Patient presents with    Derm Problem     Shea is here for melasma on her face. She has previously tried a cream she order online that did not work.      Lluvia Shirley, visit facilitator

## 2024-05-04 ENCOUNTER — HEALTH MAINTENANCE LETTER (OUTPATIENT)
Age: 39
End: 2024-05-04

## 2024-07-07 ENCOUNTER — HEALTH MAINTENANCE LETTER (OUTPATIENT)
Age: 39
End: 2024-07-07

## 2025-06-05 DIAGNOSIS — L81.1 MELASMA: ICD-10-CM

## 2025-06-05 RX ORDER — HYDROQUINONE 40 MG/G
CREAM TOPICAL
Qty: 30 G | Refills: 1 | Status: SHIPPED | OUTPATIENT
Start: 2025-06-05

## 2025-06-05 NOTE — PROGRESS NOTES
Refill provided for hydroquinone until Shea can see Dr. Santos at her new practice site.    Valery Santoyo MD

## 2025-07-19 ENCOUNTER — HEALTH MAINTENANCE LETTER (OUTPATIENT)
Age: 40
End: 2025-07-19